# Patient Record
Sex: FEMALE | Race: BLACK OR AFRICAN AMERICAN | Employment: OTHER | URBAN - METROPOLITAN AREA
[De-identification: names, ages, dates, MRNs, and addresses within clinical notes are randomized per-mention and may not be internally consistent; named-entity substitution may affect disease eponyms.]

---

## 2018-09-06 PROBLEM — I63.9 STROKE (HCC): Status: ACTIVE | Noted: 2018-09-06

## 2018-09-19 ENCOUNTER — OFFICE VISIT (OUTPATIENT)
Dept: FAMILY MEDICINE CLINIC | Age: 63
End: 2018-09-19

## 2018-09-19 VITALS
WEIGHT: 152.6 LBS | RESPIRATION RATE: 16 BRPM | DIASTOLIC BLOOD PRESSURE: 70 MMHG | OXYGEN SATURATION: 97 % | TEMPERATURE: 97.4 F | HEART RATE: 92 BPM | BODY MASS INDEX: 27.04 KG/M2 | SYSTOLIC BLOOD PRESSURE: 112 MMHG | HEIGHT: 63 IN

## 2018-09-19 DIAGNOSIS — I10 ESSENTIAL HYPERTENSION: ICD-10-CM

## 2018-09-19 DIAGNOSIS — H53.8 BLURRY VISION: ICD-10-CM

## 2018-09-19 DIAGNOSIS — R79.89 ELEVATED TSH: ICD-10-CM

## 2018-09-19 DIAGNOSIS — Z86.73 HISTORY OF CVA (CEREBROVASCULAR ACCIDENT): Primary | ICD-10-CM

## 2018-09-19 DIAGNOSIS — D64.9 ANEMIA, UNSPECIFIED TYPE: ICD-10-CM

## 2018-09-19 DIAGNOSIS — D53.9 NUTRITIONAL ANEMIA: ICD-10-CM

## 2018-09-19 DIAGNOSIS — Z12.11 COLON CANCER SCREENING: ICD-10-CM

## 2018-09-19 DIAGNOSIS — Z11.59 ENCOUNTER FOR HEPATITIS C SCREENING TEST FOR LOW RISK PATIENT: ICD-10-CM

## 2018-09-19 DIAGNOSIS — Z86.39 HISTORY OF GRAVES' DISEASE: ICD-10-CM

## 2018-09-19 DIAGNOSIS — H53.483 VISUAL FIELD CONSTRICTION, BILATERAL: ICD-10-CM

## 2018-09-19 DIAGNOSIS — E46 PROTEIN MALNUTRITION (HCC): ICD-10-CM

## 2018-09-19 DIAGNOSIS — R41.3 MEMORY PROBLEM: ICD-10-CM

## 2018-09-19 DIAGNOSIS — F17.200 TOBACCO USE DISORDER: ICD-10-CM

## 2018-09-19 DIAGNOSIS — R77.0 ABNORMAL ALBUMIN: ICD-10-CM

## 2018-09-19 DIAGNOSIS — R73.09 ELEVATED HEMOGLOBIN A1C: ICD-10-CM

## 2018-09-19 PROBLEM — R32 URINARY INCONTINENCE: Status: ACTIVE | Noted: 2018-09-19

## 2018-09-19 PROBLEM — I63.9 STROKE (HCC): Status: RESOLVED | Noted: 2018-09-06 | Resolved: 2018-09-19

## 2018-09-19 NOTE — MR AVS SNAPSHOT
[address] [place] [address] 
[phone] Patient: Baylee Chun MRN: [id number] Visit Information Date & Time Provider Department Dept. Phone Encounter #  
 9/19/2018 10:30 AM Brien Padilla MD [address] [phone] [id number] Follow-up Instructions Return in about 2 weeks (around 10/3/2018) for MEMORY/MOOD EVALUATION. Upcoming Health Maintenance Date Due Hepatitis C Screening 1955 Pneumococcal 19-64 Medium Risk (1 of 1 - PPSV23) 11/12/1974 DTaP/Tdap/Td series (1 - Tdap) 11/12/1976 PAP AKA CERVICAL CYTOLOGY 11/12/1976 BREAST CANCER SCRN MAMMOGRAM 11/12/2005 FOBT Q 1 YEAR AGE 50-75 11/12/2005 ZOSTER VACCINE AGE 60> 9/12/2015 MEDICARE YEARLY EXAM 3/20/2018 Influenza Age 5 to Adult 8/1/2018 Allergies as of 9/19/2018  Review Complete On: 9/19/2018 By: Blanka Luevano LPN Severity Noted Reaction Type Reactions Latex  12/15/2014    Other (comments)  
 rash Ampicillin  02/01/2011    Other (comments) Aspirin  11/11/2017    Palpitations Erythromycin  11/11/2017    Hives Milk Containing Products  11/11/2017    Not Reported This Time  
 Sulfa (Sulfonamide Antibiotics)  11/11/2017    Hives Tape [Adhesive]  02/22/2017    Other (comments) Tetracycline  02/01/2011    Other (comments) Current Immunizations  Never Reviewed No immunizations on file. Not reviewed this visit You Were Diagnosed With   
  
 Codes Comments History of CVA (cerebrovascular accident)    -  Primary ICD-10-CM: Z86.73 
ICD-9-CM: V12.54 Memory problem     ICD-10-CM: R41.3 ICD-9-CM: 780.93 Elevated TSH     ICD-10-CM: R94.6 ICD-9-CM: 794.5 Blurry vision     ICD-10-CM: H53.8 ICD-9-CM: 368.8 Visual field constriction, bilateral     ICD-10-CM: [id number] ICD-9-CM: 368.45   
 Elevated hemoglobin A1c     ICD-10-CM: R73.09 
ICD-9-CM: 790.29 Abnormal albumin     ICD-10-CM: R77.0 ICD-9-CM: 790.99 Anemia, unspecified type     ICD-10-CM: D64.9 ICD-9-CM: 285.9 Essential hypertension     ICD-10-CM: I10 
ICD-9-CM: 401.9 Tobacco use disorder     ICD-10-CM: F17.200 ICD-9-CM: 305.1 History of Graves' disease     ICD-10-CM: Z86.39 
ICD-9-CM: V12.29 Vitals BP Pulse Temp Resp Height(growth percentile) Weight(growth percentile) 112/70 92 97.4 °F (36.3 °C) (Oral) 16 5' 2.5\" (1.588 m) 152 lb 9.6 oz (69.2 kg) SpO2 BMI OB Status Smoking Status 97% 27.47 kg/m2 Hysterectomy Current Every Day Smoker BMI and BSA Data Body Mass Index Body Surface Area  
 27.47 kg/m 2 1.75 m 2 Preferred Pharmacy Pharmacy Name Phone [address], [address] [facility] [phone] Your Updated Medication List  
  
   
This list is accurate as of 9/19/18 11:49 AM.  Always use your most recent med list. amLODIPine 5 mg tablet Commonly known as:  Flip Chafe Take 1 Tab by mouth daily. atorvastatin 40 mg tablet Commonly known as:  LIPITOR Take 1 Tab by mouth daily. Blood-Glucose Meter monitoring kit Please specify brand: Freestyle [place] Lite CLARITIN 10 mg tablet Generic drug:  loratadine Take 10 mg by mouth daily. clopidogrel 75 mg Tab Commonly known as:  PLAVIX Take 1 Tab by mouth daily. cod liver oil Cap Take 1 Cap by mouth daily. glucose blood VI test strips strip Commonly known as:  blood glucose test  
Please specify brand:Freestyle Lite Test Strips (use with Freestyle Freedom Lite or Freestyle Lite blood glucose meters)  
  
 hydroCHLOROthiazide 25 mg tablet Commonly known as:  HYDRODIURIL Take 25 mg by mouth daily. Lancets Misc Commonly known as:  [place] Fly Bid  
  
 metFORMIN 500 mg tablet Commonly known as:  GLUCOPHAGE  
 Take 1 Tab by mouth daily (with breakfast). nicotine 21 mg/24 hr  
Commonly known as:  NICODERM CQ  
1 Patch by TransDERmal route daily for 30 days. OMEGA 3-6-9 1,200 mg Cap Generic drug:  fish,bora,flax oils-om3,6,9no1 Take 1 Tab by mouth daily. ondansetron 4 mg disintegrating tablet Commonly known as:  ZOFRAN ODT Take 1 Tab by mouth every eight (8) hours as needed for Nausea. polyethylene glycol 17 gram packet Commonly known as:  Elena Fail Take 1 Packet by mouth daily as needed. potassium chloride SR 20 mEq tablet Commonly known as:  K-TAB Take 1 Tab by mouth daily. sucralfate 100 mg/mL suspension Commonly known as:  Gaylan Skeans Take 5 mL by mouth four (4) times daily. We Performed the Following REFERRAL TO OPHTHALMOLOGY [REF57 Custom] Comments:  
 Please evaluate patient for visual field loss, blurry vision, newly diagnosed DM [place]. Follow-up Instructions Return in about 2 weeks (around 10/3/2018) for MEMORY/MOOD EVALUATION. Referral Information Referral ID Referred By Referred To  
  
 [id number] Amanda Manzo Not Available Visits Status Start Date End Date 1 New Request 9/19/18 9/19/19 If your referral has a status of pending review or denied, additional information will be sent to support the outcome of this decision. Patient Instructions STOP METFORMIN/GLUCOPHAGE RETURN IN 2 WEEKS, RETURN IN MORNING FOR BLOOD WORK AT LEAST 2 DAYS BEFORE NEXT APPOINTMENT INCREASE PROTEIN IN DIET We are sending a referral to [address] [place] [address] . You should be called about this in 1 WEEK. If no word in 1 week please give us a call to follow up Learning About Benefits From Quitting Smoking How does quitting smoking make you healthier? If you're thinking about quitting smoking, you may have a few reasons to be smoke-free. Your health may be one of them. · When you quit smoking, you lower your risks for cancer, lung disease, heart attack, stroke, blood vessel disease, and blindness from macular degeneration. · When you're smoke-free, you get sick less often, and you heal faster. You are less likely to get colds, flu, bronchitis, and pneumonia. · As a nonsmoker, you may find that your mood is better and you are less stressed. When and how will you feel healthier? Quitting has real health benefits that start from day 1 of being smoke-free. And the longer you stay smoke-free, the healthier you get and the better you feel. The first hours · After just 20 minutes, your blood pressure and heart rate go down. That means there's less stress on your heart and blood vessels. · Within 12 hours, the level of carbon monoxide in your blood drops back to normal. That makes room for more oxygen. With more oxygen in your body, you may notice that you have more energy than when you smoked. After 2 weeks · Your lungs start to work better. · Your risk of heart attack starts to drop. After 1 month · When your lungs are clear, you cough less and breathe deeper, so it's easier to be active. · Your sense of taste and smell return. That means you can enjoy food more than you have since you started smoking. Over the years · After 1 year, your risk of heart disease is half what it would be if you kept smoking. · After 5 years, your risk of stroke starts to shrink. Within a few years after that, it's about the same as if you'd never smoked. · After 10 years, your risk of dying from lung cancer is cut by about half. And your risk for many other types of cancer is lower too. How would quitting help others in your life? When you quit smoking, you improve the health of everyone who now breathes in your smoke. · Their heart, lung, and cancer risks drop, much like yours. · They are sick less.  For babies and small children, living smoke-free means they're less likely to have ear infections, pneumonia, and bronchitis. · If you're a woman who is or will be pregnant someday, quitting smoking means a healthier . · Children who are close to you are less likely to become adult smokers. Where can you learn more? Go to http://adina-yamila.info/. Enter [id number] [id number] 11 in the search box to learn more about \"Learning About Benefits From Quitting Smoking. \" Current as of: 2017 Content Version: 11.7 © 2179-8477 [employer]. Care instructions adapted under license by [employer] (which disclaims liability or warranty for this information). If you have questions about a medical condition or this instruction, always ask your healthcare professional. [address] any warranty or liability for your use of this information. Introducing [facility]! [employer] introduces [employer] patient portal. Now you can access parts of your medical record, email your doctor's office, and request medication refills online. 1. In your internet browser, go to [email or web address] 2. Click on the First Time User? Click Here link in the Sign In box. You will see the New Member Sign Up page. 3. Enter your [employer] Access Code exactly as it appears below. You will not need to use this code after youve completed the sign-up process. If you do not sign up before the expiration date, you must request a new code. · [employer] Access Code: [email or web address] Expires: 2018 10:50 PM 
 
4. Enter the last four digits of your Social Security Number (xxxx) and Date of Birth (mm/dd/yyyy) as indicated and click Submit. You will be taken to the next sign-up page. 5. Create a [employer] ID. This will be your [employer] login ID and cannot be changed, so think of one that is secure and easy to remember. 6. Create a [employer] password. You can change your password at any time. 7. Enter your Password Reset Question and Answer. This can be used at a later time if you forget your password. 8. Enter your e-mail address. You will receive e-mail notification when new information is available in [address] 9. Click Sign Up. You can now view and download portions of your medical record. 10. Click the Download Summary menu link to download a portable copy of your medical information. If you have questions, please visit the Frequently Asked Questions section of the [employer] website. Remember, [employer] is NOT to be used for urgent needs. For medical emergencies, dial 911. Now available from your iPhone and Android! Please provide this summary of care documentation to your next provider. Your primary care clinician is listed as NOT ON FILE. If you have any questions after today's visit, please call [phone].

## 2018-09-19 NOTE — PROGRESS NOTES
INTERNAL MEDICINE INITIAL PROVIDER VISIT SUBJECTIVE:  
 
Chief Complaint Patient presents with  [facility]ospital [facility] Follow Up  
  [facility] 9/7/18. Neurology app[place] 9/20/18 [place] [address]. HPI: 58 y.o. female with PMHx significant for recent CVA is here for the above chief complaint(s). Here with son Rosanna Odonnell. [facility]. Last PCP pt first.  
 
Hospital Discharge for stroke 9/6-9/2018: found to have left occipital lobe acute infarct, occlusion of left proximal PCA, old infarct right frontal lobe and bilateral cerebellum infarcts. Has f/u with neurology Dr. Joy Muniz 9/20/2018. Had complained of let sided headache, blurry vision, nausea, dizziness on admission and continued BV at time of discharge. Started on plavix 75 m g daily, statin, encouraged smoking cessation, norvasc for BP, potassium for low potassium. ST/PT/OT ordered during hospitalization but unable to find notes for ST for swallow evaluation, PT gave patient walker and HH ordered for SN/PT/OT/ST. Seen by [facility]  9/17/2018 with labs obtained K 3.7, ionized Ca 4.8, BUN/Cr 8/0.5, Hb/Hct 15/44, glucose 106, Sodium 141. Per patient and family home health has 9/11/2018 SN started with [facility]. OT came this week, PT came this week. No ST yet. Since discharge pt reports symptoms of memory loss, blurry vision, dizziness about the same since d/c, LH \"loopiness\" better since d/c. No trouble walking. Feels some concentrating on swallowing, no coughing with water or food intake. Numbness \"slight\" in left leg, intermittent. [facility]: Called [phone]. No ST order, SW order in place. Fax number [phone] order faxed for  cognitive eval and rehabilitation and swallow evaluation. Elevated A1c: 6.6 seen during recent hospitalization. No repeat to confirm. Started on glucophage 500 mg daily. Dyslipidemia: started on atorvastatin 40 mg in hospital.  No new myalgias since starting medications. HTN: Taking norvasc 5 mg daily, BP controlled today. better LH and unchanged dizziness since being on this medicine. Tobacco use disorder: not ready to quit. Discussed benefits of quitting with patient and informed to return when ready to discuss quitting plan. ROS: 12 point ROS completed, positive per HPI and hot flashes, weight gain, poor dentition, palpitations intermittently, hip/joint pain, intermittent coughing/wheezing, nausea w/o vomiting, urinary incontinence, headaches, anxiety and stress. Current Outpatient Prescriptions Medication Sig  
 amLODIPine (NORVASC) 5 mg tablet Take 1 Tab by mouth daily.  atorvastatin (LIPITOR) 40 mg tablet Take 1 Tab by mouth daily.  clopidogrel (PLAVIX) 75 mg tab Take 1 Tab by mouth daily.  hydroCHLOROthiazide (HYDRODIURIL) 25 mg tablet Take 25 mg by mouth daily.  loratadine (CLARITIN) 10 mg tablet Take 10 mg by mouth daily.  nicotine (NICODERM CQ) 21 mg/24 hr 1 Patch by TransDERmal route daily for 30 days.  polyethylene glycol (MIRALAX) 17 gram packet Take 1 Packet by mouth daily as needed.  Blood-Glucose Meter monitoring kit Please specify brand: Freestyle Freedom Lite  Lancets (LANCETS,ULTRA THIN) misc Bid  glucose blood VI test strips (BLOOD GLUCOSE TEST) strip Please specify brand:Freestyle Lite Test Strips (use with Freestyle Freedom Lite or Freestyle Lite blood glucose meters)  ondansetron (ZOFRAN ODT) 4 mg disintegrating tablet Take 1 Tab by mouth every eight (8) hours as needed for Nausea.  potassium chloride SR (K-TAB) 20 mEq tablet Take 1 Tab by mouth daily.  sucralfate (CARAFATE) 100 mg/mL suspension Take 5 mL by mouth four (4) times daily.  fish,bora,flax oils-om3,6,9 #1 (OMEGA 3-6-9) 1,200 mg cap Take 1 Tab by mouth daily.  cod liver oil cap Take 1 Cap by mouth daily. No current facility-administered medications for this visit. Health Maintenance Topic Date Due  
  Hepatitis C Screening  1955  Pneumococcal 19-64 Medium Risk (1 of 1 - PPSV23) 11/12/1974  
 DTaP/Tdap/Td series (1 - Tdap) 11/12/1976  PAP AKA CERVICAL CYTOLOGY  11/12/1976  BREAST CANCER SCRN MAMMOGRAM  11/12/2005  FOBT Q 1 YEAR AGE 50-75  11/12/2005  ZOSTER VACCINE AGE 60>  09/12/2015  MEDICARE YEARLY EXAM  03/20/2018  Influenza Age 5 to Adult  08/01/2018 Medications and Allergies: Reviewed and confirmed in the chart Past Medical Hx: Reviewed and confirmed in the chart Past Medical History:  
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  
 occlusion of left PCA seen on CTA brain  Hypertension  Memory problem 1 year per son as of 9/2018  Tobacco use disorder  UTI (urinary tract infection) Patient Active Problem List  
Diagnosis Code  History of CVA (cerebrovascular accident) Z80.78  
 Hx of ischemic left PCA stroke Z86.73  
 Elevated hemoglobin A1c R73.09  
 Abnormal albumin R77.0  Anemia D64.9  Hypertension I10  Tobacco use disorder F17.200  Environmental and seasonal allergies J30.89  Memory problem R41.3  History of Graves' disease Z86.39  
 Visual field constriction, bilateral C45.794  Blurry vision H53.8  Cataract H26.9  Arthritis M19.90  
 Urinary incontinence R32 Family Hx, Surgical Hx, Social Hx: Reviewed and updated in EMR OBJECTIVE: 
Vitals:  
 09/19/18 1051 BP: 112/70 Pulse: 92 Resp: 16 Temp: 97.4 °F (36.3 °C) TempSrc: Oral  
SpO2: 97% Weight: 152 lb 9.6 oz (69.2 kg) Height: 5' 2.5\" (1.588 m) BP Readings from Last 3 Encounters:  
09/19/18 112/70  
09/09/18 180/87  
09/03/18 (!) 148/91 Wt Readings from Last 3 Encounters:  
09/19/18 152 lb 9.6 oz (69.2 kg) 09/09/18 165 lb 12.6 oz (75.2 kg) 09/03/18 130 lb (59 kg) General: Pleasant late middle aged woman sitting comfortably in no acute distress HEENT: Head is atraumatic normo-cephalic. Pupils equally round and reactive to light, extraocular muscle intact, conjunctiva clear, non-icterus. Oral mucosa moist without erythema, ulceration. 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 CVS:Heart regular, rate, and rhythm. Audible S1 and S2. No murmurs, rubs or gallops. PULM: Lungs clear to auscultation bilaterally. No wheezes, rales or rhonchi. GI: Positive bowel sounds, soft, nondistended, non-tender. EXT: 2+ dorsalis pedis pulses bilaterally. No pedal edema bilaterally Neuro: Alert and oriented x 3. Gait wnl. .  
MSE: euthymic, affect congruent and reactive. No SI/HI. Lab Results Component Value Date/Time WBC 8.6 09/07/2018 05:02 AM  
 HGB 10.9 (L) 09/07/2018 05:02 AM  
 HCT 33.5 (L) 09/07/2018 05:02 AM  
 PLATELET 728 14/31/0835 05:02 AM  
 
Lab Results Component Value Date/Time Sodium 140 09/09/2018 05:13 AM  
 Potassium 4.7 09/09/2018 05:13 AM  
 Chloride 107 09/09/2018 05:13 AM  
 CO2 28 09/09/2018 05:13 AM  
 Glucose 108 (H) 09/09/2018 05:13 AM  
 BUN 10 09/09/2018 05:13 AM  
 Creatinine 0.6 09/09/2018 05:13 AM  
 
Lab Results Component Value Date/Time Cholesterol, total 185 09/07/2018 05:02 AM  
 HDL Cholesterol 38 (L) 09/07/2018 05:02 AM  
 LDL, calculated 123 09/07/2018 05:02 AM  
 Triglyceride 120 09/07/2018 05:02 AM  
 
9/7/2018  1:28 AM - Dionte, "[employer] Lab In Component Results Component Value Flag Ref Range Units Status Hemoglobin A1c 6.6 (H) 4.2 - 6.3 % Final  
 
9/7/2018  6:00 AM - [facility] Lab In Component Results Component Value Flag Ref Range Units Status TSH 14.200 (H) 0.358 - 3.740 uIU/mL Final  
 
 
Results CTA HEAD (Accession [id number]) (Order [id number]) Allergies Unspecified: Latex; Aspirin;  Erythromycin;  Milk Containing Products;  Sulfa (Sulfonamide Antibiotics); Tape [Adhesive] Result Information Status Provider Status Final result (Exam End: 9/7/2018 12:44 PM) Open Study Result CTA OF THE HEAD INDICATION: Follow-up left occipital lobe infarction. Vertigo COMPARISON: Head CT 9/6/2018 TECHNIQUE: Noncontrast CT of the head was performed followed by CTA of the head 
was performed with  intravenous contrast. Multiplanar two-dimensional and 
maximum intensity projection reformats performed and reviewed. FINDINGS: 
  
Noncontrast CT of the head redemonstrates also effacement within the left 
occipital lobe. Stable remote infarcts within the right frontal lobe and 
bilateral cerebellum. There is no intracranial hemorrhage. The left vertebral artery is dominant. There is focal occlusion of the left P1/P2 segment of the posterior cerebral artery seen best on image 43 series 18. There are calcifications of the bilateral carotid siphon carlos. No definite focal 
stenosis within the anterior circulation, vertebral arteries, or basilar artery. Communicating arteries are not well visualized. No definite aneurysm. IMPRESSION IMPRESSION:  
  
Occlusion of left proximal PCA Ro Hernández was notified of the above findings via telephone at 9/7/2018 1:17 PM 
EDT by Dr. Bennett Choudhary. Results CT HEAD WO CONT (Accession [id number]) (Order [id number]) Allergies Unspecified: Latex; Aspirin;  Erythromycin;  Milk Containing Products;  Sulfa (Sulfonamide Antibiotics); Tape [Adhesive] Result Information Status Provider Status Final result (Exam End: 9/6/2018  6:44 PM) Open Study Result Clinical history: Episodic peripheral vertigo EXAMINATION: 
 Noncontrast CT scan of the head 9/6/2018. 4 mm axial scanning is performed from 
the skull base to the vertex. Coronal and sagittal reconstruction imaging has 
been obtained. Correlation: None FINDINGS: 
Visualized paranasal sinuses and mastoid air cells are clear. There are 
intracranial vascular calcifications. Old bilateral cerebellar infarctions. Old 
infarctions right frontal lobe. Acute/subacute infarction left occipital lobe. No acute intracranial hemorrhage or mass. IMPRESSION IMPRESSION: 
1. Acute/subacute left occipital lobe infarction. 2. Atrophy and periventricular microvascular ischemia. 3. Old infarction right frontal lobe and bilateral cerebellum. CT Results (most recent): 
  
Results from Hospital Encounter encounter on 09/06/18 CTA NECK Narrative CTA OF THE NECK WITH CONTRAST INDICATION: Vertigo. Follow-up left occipital infarct COMPARISON: No relevant studies. TECHNIQUE: CTA of the neck was performed with nonionic intravenous contrast with 
multiplanar 2-dimensional and maximum intensity projection three-dimensional 
images. Stenosis was evaluated using the NASCET criteria. FINDINGS: 
 
Vascular calcifications are seen involving the bilateral carotid bulbs, origin 
of the left vertebral artery, and involving the bilateral carotid siphons. There 
is no hemodynamically significant stenosis within the carotid or vertebral 
arteries. There is no evidence of dissection. CAROTID STENOSIS REFERENCE USING NASCET CRITERIA 
 
% Stenosis = (1 - narrowest diameter/diameter of distal artery) x100 Mild: < 50% stenosis Moderate: 50-69% stenosis. Severe: 70-94% stenosis. Near occlusion: 95-99% stenosis. Occluded: 100% stenosis Impression IMPRESSION:  
 
No focal hemodynamically significant stenosis. Please refer to CTA of the brain 
report for additional findings (left PCA occlusion) 9/7/2018  1:47 PM - Dionte, [facility] Incoming Cardiology Results Narrative Study ID: [id number] [address], [address] [place] [address],  [address] Adult Echocardiogram Report Name: Kota Fletcher Date: 2018 10:23 AM 
MRN: [id number]                Patient Location: [facility]^0090^2341 : 1955            Age: 58 yrs Height: 62 in              Weight: 159 lb                         BSA: 1.7 m2 BP: 137/66 mmHg            HR: 56 Gender: Female             Account #: [de-identified] Reason For Study: EVAluate EF, wall motion, valves History: 
HX OF ARTHRITIS, HTN, UTI, BILATERAL HIP REPLACEMENT Ordering Physician: Bethanie Rand Performed By: Drue Romberg., [facility] Interpretation Summary A complete two-dimensional transthoracic echocardiogram was performed (2D, M- 
mode, Doppler and color flow Doppler). Patient not able to position for optimal imaging. Technically difficult 
apical imaging. Normal left ventricular size and systolic function estimated ejection 
fraction of 65% Right ventricular systolic function is normal: TAPSE: 2.0. Aortic sclerosis with no stenosis No previous for comparison Hospital records reviewed. Nursing Notes Reviewed ASSESSMENT AND PLAN 
  ICD-10-CM ICD-9-CM 1. History of CVA (cerebrovascular accident) Z80.65 V14.48 401 [employer] added to open services PT/OT/SN/SW 
F/u with neurology as scheduled Continue plavix, statin, BP control Continue PT/OT/SW 2. Memory problem R41.3 780.93 REFERRAL TO HOME HEALTH  
   METABOLIC PANEL, COMPREHENSIVE  
   VITAMIN B12  
   TSH 3RD GENERATION  
   T4, FREE  
   T PALLIDUM AB  
   FOLATE Return for memory evaluation 3.  Elevated TSH R94.6 794.5 TSH 3RD GENERATION  
   T4, FREE  
 4. Blurry vision H53.8 368.8 REFERRAL TO OPHTHALMOLOGY 5. Visual field constriction, bilateral H53.483 368.45 REFERRAL TO OPHTHALMOLOGY 6. Elevated hemoglobin A1c R73.09 790.29 HEMOGLOBIN A1C WITH EAG  
   MICROALBUMIN, UR, RAND W/ MICROALB/CREAT RATIO 7. Abnormal albumin R77.0 790.99 Encouraged to increase protein in diet 8. Anemia, unspecified type D64.9 285.9 CBC WITH AUTOMATED DIFF  
   IRON PROFILE FERRITIN  
   VITAMIN B12  
9. Essential hypertension C73 568.5 METABOLIC PANEL, COMPREHENSIVE Continue current mgmt 10. Tobacco use disorder F17.200 305.1 Pt pre-contemplative about quitting Encouraged cessation 11. History of Graves' disease Z86.39 V12.29 TSH 3RD GENERATION  
   T4, FREE 12. Encounter for hepatitis C screening test for low risk patient Z11.59 V73.89 HEPATITIS C AB  
13. Nutritional anemia D53.9 281.9 IRON PROFILE FERRITIN  
   VITAMIN B12  
   FOLATE 14. Protein malnutrition ([address] M78 872 METABOLIC PANEL, COMPREHENSIVE 15. Colon cancer screening Z12.11 V76.51 OCCULT BLOOD IMMUNOASSAY,DIAGNOSTIC Orders Placed This Encounter  METABOLIC PANEL, COMPREHENSIVE  
 CBC WITH AUTOMATED DIFF  
 IRON PROFILE  
 FERRITIN  
 VITAMIN B12  
 TSH 3RD GENERATION  
 T4, FREE  
 HEMOGLOBIN A1C WITH EAG  
 HEPATITIS C AB  T PALLIDUM AB  FOLATE  OCCULT BLOOD IMMUNOASSAY,DIAGNOSTIC  
 MICROALBUMIN, UR, RAND W/ MICROALB/CREAT RATIO  REFERRAL TO OPHTHALMOLOGY [address] [place] [address] Appointments Date Time Provider Marah Rodas 10/5/2018 3:00 PM Gayle Pena MD [address] [place] [address] printed and provided to patient Assessment and plan above discussed with patient, patient voiced understanding and agreement with plan. More than 50% of this 60 min visit was spent face to face counseling the patient about the etiology and treatment options for the above health conditions outlined in assessment and plan Gayle Pena M.D. 
 [address], [address] [place], [address] [facility] - [phone] Fax - [phone]

## 2018-09-19 NOTE — PATIENT INSTRUCTIONS
STOP METFORMIN/GLUCOPHAGE RETURN IN 2 WEEKS, RETURN IN MORNING FOR BLOOD WORK AT LEAST 2 DAYS BEFORE NEXT APPOINTMENT INCREASE PROTEIN IN DIET We are sending a referral to [address] [place] [address] . You should be called about this in 1 WEEK. If no word in 1 week please give us a call to follow up Learning About Benefits From Quitting Smoking How does quitting smoking make you healthier? If you're thinking about quitting smoking, you may have a few reasons to be smoke-free. Your health may be one of them. · When you quit smoking, you lower your risks for cancer, lung disease, heart attack, stroke, blood vessel disease, and blindness from macular degeneration. · When you're smoke-free, you get sick less often, and you heal faster. You are less likely to get colds, flu, bronchitis, and pneumonia. · As a nonsmoker, you may find that your mood is better and you are less stressed. When and how will you feel healthier? Quitting has real health benefits that start from day 1 of being smoke-free. And the longer you stay smoke-free, the healthier you get and the better you feel. The first hours · After just 20 minutes, your blood pressure and heart rate go down. That means there's less stress on your heart and blood vessels. · Within 12 hours, the level of carbon monoxide in your blood drops back to normal. That makes room for more oxygen. With more oxygen in your body, you may notice that you have more energy than when you smoked. After 2 weeks · Your lungs start to work better. · Your risk of heart attack starts to drop. After 1 month · When your lungs are clear, you cough less and breathe deeper, so it's easier to be active. · Your sense of taste and smell return. That means you can enjoy food more than you have since you started smoking. Over the years · After 1 year, your risk of heart disease is half what it would be if you kept smoking. · After 5 years, your risk of stroke starts to shrink. Within a few years after that, it's about the same as if you'd never smoked. · After 10 years, your risk of dying from lung cancer is cut by about half. And your risk for many other types of cancer is lower too. How would quitting help others in your life? When you quit smoking, you improve the health of everyone who now breathes in your smoke. · Their heart, lung, and cancer risks drop, much like yours. · They are sick less. For babies and small children, living smoke-free means they're less likely to have ear infections, pneumonia, and bronchitis. · If you're a woman who is or will be pregnant someday, quitting smoking means a healthier . · Children who are close to you are less likely to become adult smokers. Where can you learn more? Go to http://adina-yamila.info/. Enter 052 [id number] 72 11 in the search box to learn more about \"Learning About Benefits From Quitting Smoking. \" Current as of: 2017 Content Version: 11.7 © 0012-5863 [employer]. Care instructions adapted under license by [employer] (which disclaims liability or warranty for this information). If you have questions about a medical condition or this instruction, always ask your healthcare professional. [address] any warranty or liability for your use of this information.

## 2018-09-27 ENCOUNTER — TELEPHONE (OUTPATIENT)
Dept: FAMILY MEDICINE CLINIC | Age: 63
End: 2018-09-27

## 2018-09-27 NOTE — TELEPHONE ENCOUNTER
Received fax transmission from [address] [facility] of Encounter Note. Sent to be signed, sent, and scanned.

## 2018-09-28 ENCOUNTER — TELEPHONE (OUTPATIENT)
Dept: FAMILY MEDICINE CLINIC | Age: 63
End: 2018-09-28

## 2018-09-28 NOTE — TELEPHONE ENCOUNTER
Gabriel Aden with [facility] visited pt today. States called paramedics for pt due to  Had severe change in mental status. States could not understand what pt was saying. [facility] pt was hospitalized a couple of weeks ago due to CVA,. [facility] pt is being taken to [facility].

## 2018-10-03 ENCOUNTER — HOSPITAL ENCOUNTER (OUTPATIENT)
Dept: LAB | Age: 63
Discharge: HOME | End: 2018-10-03
Payer: MEDICARE

## 2018-10-03 ENCOUNTER — LAB ONLY (OUTPATIENT)
Dept: FAMILY MEDICINE CLINIC | Age: 63
End: 2018-10-03

## 2018-10-03 DIAGNOSIS — R41.3 MEMORY PROBLEM: ICD-10-CM

## 2018-10-03 DIAGNOSIS — I10 ESSENTIAL HYPERTENSION: ICD-10-CM

## 2018-10-03 DIAGNOSIS — R73.09 ELEVATED HEMOGLOBIN A1C: ICD-10-CM

## 2018-10-03 DIAGNOSIS — D53.9 NUTRITIONAL ANEMIA: ICD-10-CM

## 2018-10-03 DIAGNOSIS — Z11.59 ENCOUNTER FOR HEPATITIS C SCREENING TEST FOR LOW RISK PATIENT: ICD-10-CM

## 2018-10-03 DIAGNOSIS — D64.9 ANEMIA, UNSPECIFIED TYPE: ICD-10-CM

## 2018-10-03 DIAGNOSIS — Z86.39 HISTORY OF GRAVES' DISEASE: ICD-10-CM

## 2018-10-03 DIAGNOSIS — E46 PROTEIN MALNUTRITION (HCC): ICD-10-CM

## 2018-10-03 DIAGNOSIS — R73.09 ELEVATED HEMOGLOBIN A1C: Primary | ICD-10-CM

## 2018-10-03 DIAGNOSIS — R79.89 ELEVATED TSH: ICD-10-CM

## 2018-10-03 LAB
ALBUMIN SERPL-MCNC: 3.6 G/DL (ref 3.4–5)
ALBUMIN/GLOB SERPL: 0.9 {RATIO} (ref 0.8–1.7)
ALP SERPL-CCNC: 144 U/L (ref 45–117)
ALT SERPL-CCNC: 60 U/L (ref 13–56)
ANION GAP SERPL CALC-SCNC: 13 MMOL/L (ref 3–18)
AST SERPL-CCNC: 53 U/L (ref 15–37)
BASOPHILS # BLD: 0 K/UL (ref 0–0.1)
BASOPHILS NFR BLD: 0 % (ref 0–2)
BILIRUB SERPL-MCNC: 0.2 MG/DL (ref 0.2–1)
BUN SERPL-MCNC: 8 MG/DL (ref 7–18)
BUN/CREAT SERPL: 14 (ref 12–20)
CALCIUM SERPL-MCNC: 9.2 MG/DL (ref 8.5–10.1)
CHLORIDE SERPL-SCNC: 101 MMOL/L (ref 100–108)
CO2 SERPL-SCNC: 28 MMOL/L (ref 21–32)
CREAT SERPL-MCNC: 0.57 MG/DL (ref 0.6–1.3)
DIFFERENTIAL METHOD BLD: ABNORMAL
EOSINOPHIL # BLD: 0.1 K/UL (ref 0–0.4)
EOSINOPHIL NFR BLD: 1 % (ref 0–5)
ERYTHROCYTE [DISTWIDTH] IN BLOOD BY AUTOMATED COUNT: 14.7 % (ref 11.6–14.5)
EST. AVERAGE GLUCOSE BLD GHB EST-MCNC: 140 MG/DL
FERRITIN SERPL-MCNC: 68 NG/ML (ref 8–388)
FOLATE SERPL-MCNC: 5.8 NG/ML (ref 3.1–17.5)
GLOBULIN SER CALC-MCNC: 4.2 G/DL (ref 2–4)
GLUCOSE SERPL-MCNC: 110 MG/DL (ref 74–99)
HBA1C MFR BLD: 6.5 % (ref 4.2–5.6)
HCT VFR BLD AUTO: 38.9 % (ref 35–45)
HGB BLD-MCNC: 12.1 G/DL (ref 12–16)
IRON SATN MFR SERPL: 13 %
IRON SERPL-MCNC: 46 UG/DL (ref 50–175)
LYMPHOCYTES # BLD: 3.9 K/UL (ref 0.9–3.6)
LYMPHOCYTES NFR BLD: 36 % (ref 21–52)
MCH RBC QN AUTO: 28.1 PG (ref 24–34)
MCHC RBC AUTO-ENTMCNC: 31.1 G/DL (ref 31–37)
MCV RBC AUTO: 90.5 FL (ref 74–97)
MONOCYTES # BLD: 0.6 K/UL (ref 0.05–1.2)
MONOCYTES NFR BLD: 5 % (ref 3–10)
NEUTS SEG # BLD: 6.3 K/UL (ref 1.8–8)
NEUTS SEG NFR BLD: 58 % (ref 40–73)
PLATELET # BLD AUTO: 279 K/UL (ref 135–420)
PMV BLD AUTO: 8.7 FL (ref 9.2–11.8)
POTASSIUM SERPL-SCNC: 3.7 MMOL/L (ref 3.5–5.5)
PROT SERPL-MCNC: 7.8 G/DL (ref 6.4–8.2)
RBC # BLD AUTO: 4.3 M/UL (ref 4.2–5.3)
SODIUM SERPL-SCNC: 142 MMOL/L (ref 136–145)
T4 FREE SERPL-MCNC: 0.9 NG/DL (ref 0.7–1.5)
TIBC SERPL-MCNC: 352 UG/DL (ref 250–450)
TSH SERPL DL<=0.05 MIU/L-ACNC: 5.6 UIU/ML (ref 0.36–3.74)
VIT B12 SERPL-MCNC: 422 PG/ML (ref 211–911)
WBC # BLD AUTO: 10.8 K/UL (ref 4.6–13.2)

## 2018-10-03 PROCEDURE — 86592 SYPHILIS TEST NON-TREP QUAL: CPT | Performed by: INTERNAL MEDICINE

## 2018-10-03 PROCEDURE — 83540 ASSAY OF IRON: CPT | Performed by: INTERNAL MEDICINE

## 2018-10-03 PROCEDURE — 85025 COMPLETE CBC W/AUTO DIFF WBC: CPT | Performed by: INTERNAL MEDICINE

## 2018-10-03 PROCEDURE — 84439 ASSAY OF FREE THYROXINE: CPT | Performed by: INTERNAL MEDICINE

## 2018-10-03 PROCEDURE — 82746 ASSAY OF FOLIC ACID SERUM: CPT | Performed by: INTERNAL MEDICINE

## 2018-10-03 PROCEDURE — 83036 HEMOGLOBIN GLYCOSYLATED A1C: CPT | Performed by: INTERNAL MEDICINE

## 2018-10-03 PROCEDURE — 84443 ASSAY THYROID STIM HORMONE: CPT | Performed by: INTERNAL MEDICINE

## 2018-10-03 PROCEDURE — 82728 ASSAY OF FERRITIN: CPT | Performed by: INTERNAL MEDICINE

## 2018-10-03 PROCEDURE — 80053 COMPREHEN METABOLIC PANEL: CPT | Performed by: INTERNAL MEDICINE

## 2018-10-03 PROCEDURE — 86803 HEPATITIS C AB TEST: CPT | Performed by: INTERNAL MEDICINE

## 2018-10-03 PROCEDURE — 82607 VITAMIN B-12: CPT | Performed by: INTERNAL MEDICINE

## 2018-10-03 PROCEDURE — 86780 TREPONEMA PALLIDUM: CPT | Performed by: INTERNAL MEDICINE

## 2018-10-03 PROCEDURE — 86593 SYPHILIS TEST NON-TREP QUANT: CPT | Performed by: INTERNAL MEDICINE

## 2018-10-03 NOTE — PROGRESS NOTES
Patient presents for lab draw ordered by:    Ordering Provider:  Dr Shawna Momin Department/Practice:  [facility]  Phone:  [phone]  Date Ordered:  9/2018    The following labs were drawn and sent to [facility]  by Ellie Wright LPN:    CBC, CMP, WMT0A and Folate, T. Pallidum, Hep C, T4, B12, Ferritin, iron    The following tubes were sent:     5 SST, 2Lav, 0Blue top, 0urine    Left AC cleansed using aseptic technique. Specimen obtained using a 21 gauge butterfly  with 1 attempt. Patient tolerated process well and there was no bleeding noted at site. Patient was unable to leave a urine specimen for the urine microalbumin. Urine cup given to patient to bring specimen back at her appt time.

## 2018-10-04 ENCOUNTER — TELEPHONE (OUTPATIENT)
Dept: FAMILY MEDICINE CLINIC | Age: 63
End: 2018-10-04

## 2018-10-04 LAB
HCV AB SER IA-ACNC: 0.06 INDEX
HCV AB SERPL QL IA: NEGATIVE
HCV COMMENT,HCGAC: NORMAL
RPR SER QL: ABNORMAL
RPR SER-TITR: NORMAL {TITER}
T PALLIDUM AB SER QL IA: ABNORMAL

## 2018-10-04 NOTE — TELEPHONE ENCOUNTER
[facility] plan of care signed and placed in outgoing fax. Meng Lunsford M.D.   [address], [address] [place] [address], [address] [place] [address]   [place] [phone]

## 2018-10-05 ENCOUNTER — TELEPHONE (OUTPATIENT)
Dept: FAMILY MEDICINE CLINIC | Age: 63
End: 2018-10-05

## 2018-10-05 ENCOUNTER — HOSPITAL ENCOUNTER (OUTPATIENT)
Dept: LAB | Age: 63
Discharge: HOME | End: 2018-10-05
Payer: MEDICARE

## 2018-10-05 DIAGNOSIS — Z86.73 HISTORY OF CVA (CEREBROVASCULAR ACCIDENT): Primary | ICD-10-CM

## 2018-10-05 DIAGNOSIS — R73.09 ELEVATED HEMOGLOBIN A1C: ICD-10-CM

## 2018-10-05 PROCEDURE — 82043 UR ALBUMIN QUANTITATIVE: CPT | Performed by: INTERNAL MEDICINE

## 2018-10-05 RX ORDER — CLOPIDOGREL BISULFATE 75 MG/1
75 TABLET ORAL DAILY
Qty: 30 TAB | Refills: 0 | Status: SHIPPED | OUTPATIENT
Start: 2018-10-05 | End: 2018-11-05 | Stop reason: SDUPTHER

## 2018-10-05 RX ORDER — CLOPIDOGREL BISULFATE 75 MG/1
75 TABLET ORAL DAILY
Qty: 30 TAB | Refills: 0 | Status: SHIPPED | OUTPATIENT
Start: 2018-10-05 | End: 2018-10-05 | Stop reason: SDUPTHER

## 2018-10-05 RX ORDER — ATORVASTATIN CALCIUM 40 MG/1
40 TABLET, FILM COATED ORAL DAILY
Qty: 30 TAB | Refills: 0 | Status: CANCELLED | OUTPATIENT
Start: 2018-10-05

## 2018-10-05 NOTE — TELEPHONE ENCOUNTER
Plavix rx sent to [facility] on file. Dennis Molina M.D.   [address], [address] [place] [address], [address] [place] [address]   [facility]   [phone]

## 2018-10-06 LAB
CREAT UR-MCNC: 195.15 MG/DL (ref 30–125)
MICROALBUMIN UR-MCNC: 1.2 MG/DL (ref 0–3)
MICROALBUMIN/CREAT UR-RTO: 6 MG/G (ref 0–30)

## 2018-10-08 ENCOUNTER — OFFICE VISIT (OUTPATIENT)
Dept: FAMILY MEDICINE CLINIC | Age: 63
End: 2018-10-08

## 2018-10-08 VITALS
TEMPERATURE: 98.5 F | WEIGHT: 150.4 LBS | SYSTOLIC BLOOD PRESSURE: 111 MMHG | BODY MASS INDEX: 26.65 KG/M2 | HEIGHT: 63 IN | OXYGEN SATURATION: 100 % | DIASTOLIC BLOOD PRESSURE: 72 MMHG | RESPIRATION RATE: 16 BRPM | HEART RATE: 72 BPM

## 2018-10-08 DIAGNOSIS — Z86.73 HISTORY OF CVA (CEREBROVASCULAR ACCIDENT): ICD-10-CM

## 2018-10-08 DIAGNOSIS — A52.9 LATE SYPHILIS: ICD-10-CM

## 2018-10-08 DIAGNOSIS — E66.3 OVERWEIGHT (BMI 25.0-29.9): ICD-10-CM

## 2018-10-08 DIAGNOSIS — R79.89 ELEVATED LFTS: ICD-10-CM

## 2018-10-08 DIAGNOSIS — I10 ESSENTIAL HYPERTENSION: ICD-10-CM

## 2018-10-08 DIAGNOSIS — E11.9 CONTROLLED TYPE 2 DIABETES MELLITUS WITHOUT COMPLICATION, WITHOUT LONG-TERM CURRENT USE OF INSULIN (HCC): ICD-10-CM

## 2018-10-08 DIAGNOSIS — F17.200 TOBACCO USE DISORDER: ICD-10-CM

## 2018-10-08 DIAGNOSIS — R41.3 MEMORY LOSS: Primary | ICD-10-CM

## 2018-10-08 NOTE — PROGRESS NOTES
Chief Complaint Patient presents with  Follow-up  
  memory, vision 1. Have you been to the ER, urgent care clinic since your last visit? Hospitalized since your last visit? No 
 
2. Have you seen or consulted any other health care providers outside of the [address] [place] [address] since your last visit? Include any pap smears or colon screening.  No

## 2018-10-08 NOTE — MR AVS SNAPSHOT
[address] [place] [address] [address] [address] 
[phone] Patient: Deborah Verma MRN: [id number] Visit Information Date & Time Provider Department Dept. Phone Encounter #  
 10/8/2018  4:30 PM Lynda Beckman MD [address] [phone] [id number] Follow-up Instructions Return in about 4 weeks (around 11/5/2018), or if symptoms worsen or fail to improve, for medicare wellness. Upcoming Health Maintenance Date Due Pneumococcal 19-64 Medium Risk (1 of 1 - PPSV23) 11/12/1974 DTaP/Tdap/Td series (1 - Tdap) 11/12/1976 PAP AKA CERVICAL CYTOLOGY 11/12/1976 Shingrix Vaccine Age 50> (1 of 2) 11/12/2005 BREAST CANCER SCRN MAMMOGRAM 11/12/2005 FOBT Q 1 YEAR AGE 50-75 11/12/2005 MEDICARE YEARLY EXAM 3/20/2018 Influenza Age 5 to Adult 8/1/2018 Allergies as of 10/8/2018  Review Complete On: 10/8/2018 By: Poppy Fish LPN Severity Noted Reaction Type Reactions Latex  12/15/2014    Other (comments)  
 rash Ampicillin  02/01/2011    Other (comments) Aspirin  11/11/2017    Palpitations Erythromycin  11/11/2017    Hives Milk Containing Products  11/11/2017    Not Reported This Time  
 Sulfa (Sulfonamide Antibiotics)  11/11/2017    Hives Tape [Adhesive]  02/22/2017    Other (comments) Tetracycline  02/01/2011    Other (comments) Current Immunizations  Never Reviewed No immunizations on file. Not reviewed this visit You Were Diagnosed With   
  
 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 
ICD-9-CM: V12.54 Overweight (BMI 25.0-29. 9)     ICD-10-CM: N44.0 ICD-9-CM: 278.02 Essential hypertension     ICD-10-CM: I10 
ICD-9-CM: 401.9 Tobacco use disorder     ICD-10-CM: F17.200 ICD-9-CM: 305.1 Elevated LFTs     ICD-10-CM: R94.5 ICD-9-CM: 790.6 Vitals BP Pulse Temp Resp Height(growth percentile) Weight(growth percentile) 111/72 72 98.5 °F (36.9 °C) (Oral) 16 5' 2.5\" (1.588 m) 150 lb 6.4 oz (68.2 kg) SpO2 BMI OB Status Smoking Status 100% 27.07 kg/m2 Hysterectomy Current Every Day Smoker BMI and BSA Data Body Mass Index Body Surface Area  
 27.07 kg/m 2 1.73 m 2 Preferred Pharmacy Pharmacy Name Phone [address], [address] [facility] [phone] Your Updated Medication List  
  
   
This list is accurate as of 10/8/18  5:24 PM.  Always use your most recent med list. amLODIPine 5 mg tablet Commonly known as:  Jr [place] Take 1 Tab by mouth daily. atorvastatin 40 mg tablet Commonly known as:  LIPITOR Take 1 Tab by mouth daily. CLARITIN 10 mg tablet Generic drug:  loratadine Take 10 mg by mouth daily. clopidogrel 75 mg Tab Commonly known as:  PLAVIX Take 1 Tab by mouth daily. hydroCHLOROthiazide 25 mg tablet Commonly known as:  HYDRODIURIL Take 25 mg by mouth daily. OMEGA 3-6-9 1,200 mg Cap Generic drug:  fish,bora,flax oils-om3,6,9no1 Take 1 Tab by mouth daily. Follow-up Instructions Return in about 4 weeks (around 11/5/2018), or if symptoms worsen or fail to improve, for medicare wellness. To-Do List   
 10/10/2018  8:00 PM  
(Arrive by 7:45 PM) Appointment with [address] at [facility] ([phone]) Please bring a list of ALL medications that you are currently taking (include prescription and over the counter medications). NO SHOW FEE:  Due to patient demand, and limited availability of appointments we have instituted a $100.00 no show fee. As of April 1, 2011, you must give a 24 hour notice in advance to avoid a no show fee.  Failure to do so within this limited time or failure to show up for scheduled appointments will result in a $100.00 fee charged to your account. The bill will be sent to the account desir and will not get charged to the insurance company. This is your responsibility and payment is expected immediately. 1. Please continue taking all prescribed medications unless otherwise instructed by the Sleep Lab. If you need to take medication while you are in the laboratory, bring them with you. We are an outpatient facility and we will not be able to provide any medications for you. Also bring a list of all current medications. 2.  Please DO NOT drink ANY alcohol on the day of your sleep study unless otherwise instructed by the Sleep Lab. 3.  Please DO NOT drink ANY stimulant (caffeinated) beverages AFTER 3:00pm; therefore, avoid coffee, tea, Coke, Pepsi, Mountain Dew, etc.       4.  Please DO NOT take ANY naps on the day of your study unless absolutely necessary. 5.  Please bring all necessary items needed for your stay in the laboratory (sleepwear, toothbrush, shampoo, favorite pillow, etc.). The laboratory supplies towels and soap. We suggest two-piece pajamas rather than a nightgown for female patients. Please remember to take your belongings with you the next morning. If you leave your belongings we will hold them for 2 weeks for you to come back and pick them up. After then we will donate them to [facility] ([facility]). 6.  Please shower and do not add any oils or lotions to your body or hair before coming into the lab. Use shampoo only. 7.  Please remember to wear your portable oxygen to the center if you wear it during the day. 8.  Check in at the [facility] located in the [facility]. Please arrive 15 minutes ahead of appointment time for orientation. Upon arrival at the laboratory, you will be assigned a private room and asked to complete several forms. Following this paperwork, you will prepare for bed and sensors will then be taped and glued to your scalp and body. There will be no electricity going through your body. We will record only the natural activity of your body. Also note that we will be monitoring you throughout the night with a low light camera, so as to identify different positions and for safety reasons  (i.e. to make sure you do not fall out of bed). TO CANCEL APPOINTMENTS, PLEASE CONTACT THE SLEEP LAB AT [phone]. To reschedule appointments, please contact Outpatient Scheduling at [phone]. Please arrive 30 minutes prior to appointment to register 10/11/2018  7:30 AM  
(Arrive by 7:00 AM) Appointment with [facility] at [facility] ([phone]) 1. Wash your hair the night before test.  Use only shampoo and water come to the lab with clean, dry hair. Do not use gels, mousse, hair spray or oils of any kind after washing your hair. Remove all extensions and or hair weaves. If not removed, we may not be able to do your study. 2.  Take all regular medication as scheduled, unless your doctor specifically tells you to stop. 3.  Continue to eat as usual.     4.  Bring a written list of all current medications including over the counter medications and any supplements you take. Please arrive 30 minutes prior to appaointment to register Patient Instructions FOR DIABETES:  
Work on low sugar diet outlined below Increase exercise. FOR LIGHTHEADEDNESS: 
Try to drink 64 ounces of water per day Try to cut back on soda/caffiene Low salt diet, less than 2 grams per 24 hours FOR WEIGHT: 
Continue to work on low sugar/carbohydrate diet for weight loss Exercise 30 minutes a day 4-5 days week Learning About Low-Carbohydrate Diets for Weight Loss What is a low-carbohydrate diet? Low-carb diets avoid foods that are high in carbohydrate.  These high-carb foods include pasta, bread, rice, cereal, fruits, and starchy vegetables. Instead, these diets usually have you eat foods that are high in fat and protein. Many people lose weight quickly on a low-carb diet. But the early weight loss is water. People on this diet often gain the weight back after they start eating carbs again. Not all diet plans are safe or work well. A lot of the evidence shows that low-carb diets aren't healthy. That's because these diets often don't include healthy foods like fruits and vegetables. Losing weight safely means balancing protein, fat, and carbs with every meal and snack. And low-carb diets don't always provide the vitamins, minerals, and fiber you need. If you have a serious medical condition, talk to your doctor before you try any diet. These conditions include kidney disease, heart disease, type 2 diabetes, high cholesterol, and high blood pressure. If you are pregnant, it may not be safe for your baby if you are on a low-carb diet. How can you lose weight safely? You might have heard that a diet plan helped another person lose weight. But that doesn't mean that it will work for you. It is very hard to stay on a diet that includes lots of big changes in your eating habits. If you want to get to a healthy weight and stay there, making healthy lifestyle changes will often work better than dieting. These steps can help. · Make a plan for change. Work with your doctor to create a plan that is right for you. · See a dietitian. He or she can show you how to make healthy changes in your eating habits. · Manage stress. If you have a lot of stress in your life, it can be hard to focus on making healthy changes to your daily habits. · Track your food and activity. You are likely to do better at losing weight if you keep track of what you eat and what you do. Follow-up care is a key part of your treatment and safety.  Be sure to make and go to all appointments, and call your doctor if you are having problems. It's also a good idea to know your test results and keep a list of the medicines you take. Where can you learn more? Go to http://adina-yamila.info/. Enter A121 in the search box to learn more about \"Learning About Low-Carbohydrate Diets for Weight Loss. \" Current as of: December 8, 2016 Content Version: 11.3 © 7319-8052 Tubaloo. Care instructions adapted under license by [employer] (which disclaims liability or warranty for this information). If you have questions about a medical condition or this instruction, always ask your healthcare professional. [address] any warranty or liability for your use of this information. Body Mass Index: Care Instructions Your Care Instructions Body mass index (BMI) can help you see if your weight is raising your risk for health problems. It uses a formula to compare how much you weigh with how tall you are. · A BMI lower than 18.5 is considered underweight. · A BMI between 18.5 and 24.9 is considered healthy. · A BMI between 25 and 29.9 is considered overweight. A BMI of 30 or higher is considered obese. If your BMI is in the normal range, it means that you have a lower risk for weight-related health problems. If your BMI is in the overweight or obese range, you may be at increased risk for weight-related health problems, such as high blood pressure, heart disease, stroke, arthritis or joint pain, and diabetes. If your BMI is in the underweight range, you may be at increased risk for health problems such as fatigue, lower protection (immunity) against illness, muscle loss, bone loss, hair loss, and hormone problems. BMI is just one measure of your risk for weight-related health problems.  You may be at higher risk for health problems if you are not active, you eat an unhealthy diet, or you drink too much alcohol or use tobacco products. Follow-up care is a key part of your treatment and safety. Be sure to make and go to all appointments, and call your doctor if you are having problems. It's also a good idea to know your test results and keep a list of the medicines you take. How can you care for yourself at home? · Practice healthy eating habits. This includes eating plenty of fruits, vegetables, whole grains, lean protein, and low-fat dairy. · If your doctor recommends it, get more exercise. Walking is a good choice. Bit by bit, increase the amount you walk every day. Try for at least 30 minutes on most days of the week. · Do not smoke. Smoking can increase your risk for health problems. If you need help quitting, talk to your doctor about stop-smoking programs and medicines. These can increase your chances of quitting for good. · Limit alcohol to 2 drinks a day for men and 1 drink a day for women. Too much alcohol can cause health problems. If you have a BMI higher than 25 · Your doctor may do other tests to check your risk for weight-related health problems. This may include measuring the distance around your waist. A waist measurement of more than 40 inches in men or 35 inches in women can increase the risk of weight-related health problems. · Talk with your doctor about steps you can take to stay healthy or improve your health. You may need to make lifestyle changes to lose weight and stay healthy, such as changing your diet and getting regular exercise. If you have a BMI lower than 18.5 · Your doctor may do other tests to check your risk for health problems. · Talk with your doctor about steps you can take to stay healthy or improve your health. You may need to make lifestyle changes to gain or maintain weight and stay healthy, such as getting more healthy foods in your diet and doing exercises to build muscle. Where can you learn more? Go to http://adina-yamila.info/. Enter S176 in the search box to learn more about \"Body Mass Index: Care Instructions. \" Current as of: October 13, 2016 Content Version: 11.4 © 2045-6626 Dustcloud. Care instructions adapted under license by [employer] (which disclaims liability or warranty for this information). If you have questions about a medical condition or this instruction, always ask your healthcare professional. [address] any warranty or liability for your use of this information. Introducing [facility]! Urmila Morales introduces [facility] patient portal. Now you can access parts of your medical record, email your doctor's office, and request medication refills online. 1. In your internet browser, go to [email or web address] 2. Click on the First Time User? Click Here link in the Sign In box. You will see the New Member Sign Up page. 3. Enter your [facility] Access Code exactly as it appears below. You will not need to use this code after youve completed the sign-up process. If you do not sign up before the expiration date, you must request a new code. · [employer] Access Code: [email or web address] Expires: 12/2/2018 10:50 PM 
 
4. Enter the last four digits of your Social Security Number (xxxx) and Date of Birth (mm/dd/yyyy) as indicated and click Submit. You will be taken to the next sign-up page. 5. Create a [facility] ID. This will be your [facility] login ID and cannot be changed, so think of one that is secure and easy to remember. 6. Create a [facility] password. You can change your password at any time. 7. Enter your Password Reset Question and Answer. This can be used at a later time if you forget your password. 8. Enter your e-mail address. You will receive e-mail notification when new information is available in [address] 9. Click Sign Up.  You can now view and download portions of your medical record. 10. Click the Download Summary menu link to download a portable copy of your medical information. If you have questions, please visit the Frequently Asked Questions section of the WineSimple website. Remember, WineSimple is NOT to be used for urgent needs. For medical emergencies, dial 911. Now available from your iPhone and Android! Please provide this summary of care documentation to your next provider. Your primary care clinician is listed as Jules Solano. If you have any questions after today's visit, please call [phone].

## 2018-10-08 NOTE — PATIENT INSTRUCTIONS
FOR DIABETES:  
Work on low sugar diet outlined below Increase exercise. FOR LIGHTHEADEDNESS: 
Try to drink 64 ounces of water per day Try to cut back on soda/caffiene Low salt diet, less than 2 grams per 24 hours FOR WEIGHT: 
Continue to work on low sugar/carbohydrate diet for weight loss Exercise 30 minutes a day 4-5 days week Learning About Low-Carbohydrate Diets for Weight Loss What is a low-carbohydrate diet? Low-carb diets avoid foods that are high in carbohydrate. These high-carb foods include pasta, bread, rice, cereal, fruits, and starchy vegetables. Instead, these diets usually have you eat foods that are high in fat and protein. Many people lose weight quickly on a low-carb diet. But the early weight loss is water. People on this diet often gain the weight back after they start eating carbs again. Not all diet plans are safe or work well. A lot of the evidence shows that low-carb diets aren't healthy. That's because these diets often don't include healthy foods like fruits and vegetables. Losing weight safely means balancing protein, fat, and carbs with every meal and snack. And low-carb diets don't always provide the vitamins, minerals, and fiber you need. If you have a serious medical condition, talk to your doctor before you try any diet. These conditions include kidney disease, heart disease, type 2 diabetes, high cholesterol, and high blood pressure. If you are pregnant, it may not be safe for your baby if you are on a low-carb diet. How can you lose weight safely? You might have heard that a diet plan helped another person lose weight. But that doesn't mean that it will work for you. It is very hard to stay on a diet that includes lots of big changes in your eating habits. If you want to get to a healthy weight and stay there, making healthy lifestyle changes will often work better than dieting. These steps can help. · Make a plan for change. Work with your doctor to create a plan that is right for you. · See a dietitian. He or she can show you how to make healthy changes in your eating habits. · Manage stress. If you have a lot of stress in your life, it can be hard to focus on making healthy changes to your daily habits. · Track your food and activity. You are likely to do better at losing weight if you keep track of what you eat and what you do. Follow-up care is a key part of your treatment and safety. Be sure to make and go to all appointments, and call your doctor if you are having problems. It's also a good idea to know your test results and keep a list of the medicines you take. Where can you learn more? Go to http://adina-yamila.info/. Enter A121 in the search box to learn more about \"Learning About Low-Carbohydrate Diets for Weight Loss. \" Current as of: December 8, 2016 Content Version: 11.3 © 4176-9596 Liquid5. Care instructions adapted under license by [employer] (which disclaims liability or warranty for this information). If you have questions about a medical condition or this instruction, always ask your healthcare professional. [address] any warranty or liability for your use of this information. Body Mass Index: Care Instructions Your Care Instructions Body mass index (BMI) can help you see if your weight is raising your risk for health problems. It uses a formula to compare how much you weigh with how tall you are. · A BMI lower than 18.5 is considered underweight. · A BMI between 18.5 and 24.9 is considered healthy. · A BMI between 25 and 29.9 is considered overweight. A BMI of 30 or higher is considered obese. If your BMI is in the normal range, it means that you have a lower risk for weight-related health problems.  If your BMI is in the overweight or obese range, you may be at increased risk for weight-related health problems, such as high blood pressure, heart disease, stroke, arthritis or joint pain, and diabetes. If your BMI is in the underweight range, you may be at increased risk for health problems such as fatigue, lower protection (immunity) against illness, muscle loss, bone loss, hair loss, and hormone problems. BMI is just one measure of your risk for weight-related health problems. You may be at higher risk for health problems if you are not active, you eat an unhealthy diet, or you drink too much alcohol or use tobacco products. Follow-up care is a key part of your treatment and safety. Be sure to make and go to all appointments, and call your doctor if you are having problems. It's also a good idea to know your test results and keep a list of the medicines you take. How can you care for yourself at home? · Practice healthy eating habits. This includes eating plenty of fruits, vegetables, whole grains, lean protein, and low-fat dairy. · If your doctor recommends it, get more exercise. Walking is a good choice. Bit by bit, increase the amount you walk every day. Try for at least 30 minutes on most days of the week. · Do not smoke. Smoking can increase your risk for health problems. If you need help quitting, talk to your doctor about stop-smoking programs and medicines. These can increase your chances of quitting for good. · Limit alcohol to 2 drinks a day for men and 1 drink a day for women. Too much alcohol can cause health problems. If you have a BMI higher than 25 · Your doctor may do other tests to check your risk for weight-related health problems. This may include measuring the distance around your waist. A waist measurement of more than 40 inches in men or 35 inches in women can increase the risk of weight-related health problems.  
· Talk with your doctor about steps you can take to stay healthy or improve your health. You may need to make lifestyle changes to lose weight and stay healthy, such as changing your diet and getting regular exercise. If you have a BMI lower than 18.5 · Your doctor may do other tests to check your risk for health problems. · Talk with your doctor about steps you can take to stay healthy or improve your health. You may need to make lifestyle changes to gain or maintain weight and stay healthy, such as getting more healthy foods in your diet and doing exercises to build muscle. Where can you learn more? Go to http://adina-yamila.info/. Enter S176 in the search box to learn more about \"Body Mass Index: Care Instructions. \" Current as of: October 13, 2016 Content Version: 11.4 © 9063-1116 Healthwise, Incorporated. Care instructions adapted under license by [employer] (which disclaims liability or warranty for this information). If you have questions about a medical condition or this instruction, always ask your healthcare professional. [address] any warranty or liability for your use of this information.

## 2018-10-08 NOTE — PROGRESS NOTES
Internal Medicine Follow Up Visit Note Chief Complaint Patient presents with  Follow-up  
  memory, vision HPI:  Myrtle Regan is a 58 y.o.  female with history significant for h/o CVA 9/2018 is here for the above complaint(s). Comes to clinic with son, Anirudh Tipton. Memory problems: problem with short and long term memory. Independent with clothing, bathing, feeding, toileting. Needs help with bills, doctors appointments, shopping and medications. Some anger outburst at home. No AVH. Some paranoia. Some depressed mood, no anxiety. No SI/HI. HH started since last visit. Positive syphilis test: No known h/o of syphillis. No ulcers, skin changes, rashes or heart symptoms including palpitations, chest pain, ect. Would like to know about diet restrictions: discussed with patient and son. Elevated LFT: No alcohol or tylenol. States medications make feel \"woozy\" but doesn't know which ones. Morning pills makes \"woozy\". Drinking 8 ounce bottles of water per day. S/P CVA Stroke 9/6-9/2018: found to have left occipital lobe acute infarct, occlusion of left proximal PCA, old infarct right frontal lobe and bilateral cerebellum infarcts. Has f/u with neurology Dr. Andrea Beebe 9/20/2018. Had complained of let sided headache, blurry vision, nausea, dizziness on admission and continued BV at time of discharge. Started on plavix 75 m g daily, statin, encouraged smoking cessation, norvasc for BP, potassium for low potassium. ST/PT/OT ordered during hospitalization but unable to find notes for ST for swallow evaluation, PT gave patient walker and HH ordered for SN/PT/OT/ST. Seen by [facility]  9/17/2018 with labs obtained K 3.7, ionized Ca 4.8, BUN/Cr 8/0.5, Hb/Hct 15/44, glucose 106, Sodium 141. Per patient and family home health has 9/11/2018 SN started with [facility]. OT came this week, PT came this week.  No ST yet. Since discharge pt reports symptoms of memory loss, blurry vision, dizziness about the same since d/c, LH \"loopiness\" better since d/c. No trouble walking. Feels some concentrating on swallowing, no coughing with water or food intake. Numbness \"slight\" in left leg, intermittent. [facility] care: Called [phone]. No ST order, SW order in place. Fax number [phone] order faxed for  cognitive eval and rehabilitation and swallow evaluation. Diabetes type 2: 6.6 A1c seen during recent hospitalization. A1c Repeat 6.5 10/2018, d/c metformin after last visit. Dyslipidemia: Continues on atorvastatin 40 mg in hospital.  No new myalgias since starting medications. HTN: Taking norvasc 5 mg daily, BP controlled today. better LH and unchanged dizziness since being on this medicine. Tobacco use disorder: not ready to quit. Discussed benefits of quitting with patient and informed to return when ready to discuss quitting plan. Current Outpatient Prescriptions Medication Sig  clopidogrel (PLAVIX) 75 mg tab Take 1 Tab by mouth daily.  amLODIPine (NORVASC) 5 mg tablet Take 1 Tab by mouth daily.  atorvastatin (LIPITOR) 40 mg tablet Take 1 Tab by mouth daily.  hydroCHLOROthiazide (HYDRODIURIL) 25 mg tablet Take 25 mg by mouth daily.  fish,bora,flax oils-om3,6,9 #1 (OMEGA 3-6-9) 1,200 mg cap Take 1 Tab by mouth daily.  loratadine (CLARITIN) 10 mg tablet Take 10 mg by mouth daily. No current facility-administered medications for this visit. Health Maintenance Topic Date Due  Pneumococcal 19-64 Medium Risk (1 of 1 - PPSV23) 11/12/1974  
 DTaP/Tdap/Td series (1 - Tdap) 11/12/1976  PAP AKA CERVICAL CYTOLOGY  11/12/1976  Shingrix Vaccine Age 50> (1 of 2) 11/12/2005  BREAST CANCER SCRN MAMMOGRAM  11/12/2005  FOBT Q 1 YEAR AGE 50-75  11/12/2005  MEDICARE YEARLY EXAM  03/20/2018  Influenza Age 5 to Adult  08/01/2018  Hepatitis C Screening  Completed There is no immunization history on file for this patient. Allergies and Medications: Reviewed and updated in EMR. Patient Active Problem List  
Diagnosis Code  History of CVA (cerebrovascular accident) Z80.78  
 Hx of ischemic left PCA stroke Z86.73  
 Abnormal albumin R77.0  Anemia D64.9  Hypertension I10  Tobacco use disorder F17.200  Environmental and seasonal allergies J30.89  Memory problem R41.3  History of Graves' disease Z86.39  
 Visual field constriction, bilateral W91.479  Blurry vision H53.8  Cataract H26.9  Arthritis M19.90  
 Urinary incontinence R32  Memory loss R41.3 Family History, Social History, Past Medical History, Surgical History: Reviewed and updated in EMR as appropriate. OBJECTIVE:  
Visit Vitals  /72  Pulse 72  Temp 98.5 °F (36.9 °C) (Oral)  Resp 16  
 Ht 5' 2.5\" (1.588 m)  Wt 150 lb 6.4 oz (68.2 kg)  SpO2 100%  BMI 27.07 kg/m2 BP Readings from Last 3 Encounters:  
10/08/18 111/72  
09/28/18 132/80  
09/19/18 112/70 Wt Readings from Last 3 Encounters:  
10/08/18 150 lb 6.4 oz (68.2 kg) 09/28/18 155 lb (70.3 kg) 09/19/18 152 lb 9.6 oz (69.2 kg) General: Pleasant adult woman sitting comfortably in no acute distress HEENT: Head is atraumatic normo-cephalic. EXT: 2+ dorsalis pedis pulses bilaterally. No pedal edema bilaterally Neuro: Alert and oriented x 3. Gait wnl. No focal muscle weakness appreciated on gross visualization. MSE: mood euthymic, affect congruent and reactive. MOCA: 22/30 3/5 visual spatial, 0/5 delayed recall, 5/6 orientation. Nursing Notes Reviewed. LABS/RADIOLOGICAL TESTS: 
 
Lab Results Component Value Date/Time WBC 10.8 10/03/2018 11:15 AM  
 HGB 12.1 10/03/2018 11:15 AM  
 HCT 38.9 10/03/2018 11:15 AM  
 PLATELET 238 21/83/1276 11:15 AM  
 
Lab Results Component Value Date/Time  Sodium 142 10/03/2018 11:15 AM  
 Potassium 3.7 10/03/2018 11:15 AM  
 Chloride 101 10/03/2018 11:15 AM  
 CO2 28 10/03/2018 11:15 AM  
 Glucose 110 (H) 10/03/2018 11:15 AM  
 BUN 8 10/03/2018 11:15 AM  
 Creatinine 0.57 (L) 10/03/2018 11:15 AM  
 
Lab Results Component Value Date/Time Cholesterol, total 185 09/07/2018 05:02 AM  
 HDL Cholesterol 38 (L) 09/07/2018 05:02 AM  
 LDL, calculated 123 09/07/2018 05:02 AM  
 Triglyceride 120 09/07/2018 05:02 AM  
 
 
10/4/2018  3:21 PM - [facility], Lab In [facility]  
Component Results Component RPR titer 1:1  
 
10/4/2018  3:21 PM - [facility], Lab In [facility]  
Component Results Component Value Flag Ref Range Units Status RPR Confirmed Reactive (A) NR   Final  
 
10/6/2018  4:00 PM - [facility], [facility]  
Component Results Component Value Flag Ref Range Units Status Microalbumin,urine random 1.20  0 - 3.0 MG/DL Final  
Creatinine, urine 195.15 (H) 30 - 125 mg/dL Final  
Microalbumin/Creat ratio (mg/g creat) 6  0 - 30 mg/g Final  
Lab and Collection MICROALBUMIN, UR, RAND W/ MICROALB/CREAT RATIO (Order #[id number]) on 10/5/2018 - Lab and Collection Information Report Information Status Final result (Resulted: 10/6/2018  4:00 PM) Lab Information Lab  
[facility] Dr. Johnson Cardenas. Gray Dowell,  [address] [place] [address] How to build your own SmartLinks MICROALBUMIN, UR, RAND W/ MICROALB/CREAT RATIO (Order #[id number]) on 10/5/18 Order MICROALBUMIN, UR, RAND W/ MICROALB/CREAT RATIO [[id number]] (Order [id number]) Reprint Requisition to Label Paper MICROALBUMIN, UR, RAND W/ MICROALB/CREAT RATIO (Order #[id number]) on 10/5/18 Order Providers Authorizing Encounter [facility] LAB OUTREACH INSURANCE Order Information Order Date/Time Release Date/Time Start Date/Time End Date/Time 10/05/18 08:37 PM 10/05/18 08:37 PM 10/05/18 08:37 PM 10/5/2018 Order Details Frequency Duration Priority Order Class ONE TIME 1  occurrence Routine Hospital Lab Reprint OrderRequisition - Outpatient Only MICROALBUMIN, UR, RAND W/ MICROALB/CREAT RATIO (Order #[id number]) on 10/5/18 Associated Diagnoses ICD-10-CM ICD-9-CM Elevated hemoglobin A1c   
 R73.09 790.29 Original Order Ordered On Ordered By  
  
9/19/2018 12:58 PM Tereasa Duverney, MD   
    
Collection Information Accession #: [id number] SpecimenType: Urine Urine, random Collected: 10/5/2018  1:00 PM  
 Resulting Agency: [facility] Appointments for this Order 10/5/2018 8:40 PM - 5 min [facility] LAB OUTREACH INSURANCE (Resource) [employer] Additional Information Associated Reports View Encounter Priority and Order Details Collection Information Medication Detail Disp Refills Start End  
  
MICROALBUMIN, UR, RAND W/ MICROALB/CREAT RATIO   10/5/2018 10/5/2018 Sig: One time. Class: Hospital Lab   
 
10/3/2018 10:46 PM - [facility], Lab In [facility]  
Component Results Component Value Flag Ref Range Units Status Folate 5.8  3.10 - 17.50 ng/mL Final  
 
10/4/2018  3:20 PM - [facility], Lab In [facility]  
Component Results Component Value Flag Ref Range Units Status T. pallidum interpretation. PRELIMINARY REACTIVE (A) NR   Final  
 
10/4/2018 10:24 AM - [facility], Lab In [facility]  
Component Results Component Value Flag Ref Range Units Status Hepatitis C virus Ab 0.06  <0.80 Index Final  
Hep C  virus Ab Interp. NEGATIVE   NEG   Final  
Hep C  virus Ab comment         Final  
 
10/3/2018 10:46 PM - [facility], Lab [facility]  
Component Results Component Value Flag Ref Range Units Status Sodium 142  136 - 145 mmol/L Final  
Potassium 3.7  3.5 - 5.5 mmol/L Final  
Chloride 101  100 - 108 mmol/L Final  
CO2 28  21 - 32 mmol/L Final  
Anion gap 13  3.0 - 18 mmol/L Final  
Glucose 110 (H) 74 - 99 mg/dL Final  
BUN 8  7.0 - 18 MG/DL Final  
Creatinine 0.57 (L) 0.6 - 1.3 MG/DL Final  
 BUN/Creatinine ratio 14  12 - 20   Final  
GFR est AA >60  >60 ml/min/1.73m2 Final  
GFR est non-AA >60  >60 ml/min/1.73m2 Final  
Comment:  
Calcium 9.2  8.5 - 10.1 MG/DL Final  
Bilirubin, total 0.2  0.2 - 1.0 MG/DL Final  
ALT (SGPT) 60 (H) 13 - 56 U/L Final  
AST (SGOT) 53 (H) 15 - 37 U/L Final  
Alk. phosphatase 144 (H) 45 - 117 U/L Final  
Protein, total 7.8  6.4 - 8.2 g/dL Final  
Albumin 3.6  3.4 - 5.0 g/dL Final  
Globulin 4.2 (H) 2.0 - 4.0 g/dL Final  
A-G Ratio 0.9  0.8 - 1.7   Final  
 
10/3/2018 10:46 PM - [facility], Lab In [facility]  
Component Results Component Value Flag Ref Range Units Status Iron 46 (L) 50 - 175 ug/dL Final  
Comment:  
Patients receiving metal-binding drugs (e.g. deferoxamine) may show spuriously depressed iron values, as chelated iron may not properly react in the iron assay. TIBC 352  250 - 450 ug/dL Final  
Iron % saturation 13   % Final  
 
10/3/2018 10:46 PM - [facility], Lab In [facility]  
Component Results Component Value Flag Ref Range Units Status Ferritin 68  8 - 388 NG/ML Final  
 
10/3/2018 10:46 PM - [facility]te, Lab In [facility]  
Component Results Component Value Flag Ref Range Units Status Vitamin B12 422  211 - 911 pg/mL Final  
 
All lab results and radiological studies were reviewed and discussed with the patient. ASSESSMENT/PLAN:   
  ICD-10-CM ICD-9-CM 1. Memory loss R41.3 780.93 Continue current mgmt Encouraged help from son 2. 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  
3. History of CVA (cerebrovascular accident) Z86.73 V12.54 Continue current mgmt F/u with neurology 4. Overweight (BMI 25.0-29. 9) E66.3 278.02 Diet and exercise plan discussed BMI education provided 5. Essential hypertension I10 401.9 Continue current mgmt 6. Tobacco use disorder F17.200 305.1 Continue to encourage cessation, pt precontemplative 7. Elevated LFTs R94.5 790.6 recheck Requested Prescriptions No prescriptions requested or ordered in this encounter Patient verbalized understanding and agreement with the plan. Patient was given an after-visit summary. Follow-up Disposition: 
Return in about 4 weeks (around 11/5/2018), or if symptoms worsen or fail to improve, for medicare wellness. or sooner if worsening symptoms. More than 50% of this 45 min visit was spent counseling the patient face to face about etiology and treatment of health conditions outlined in assessment and plan. Cathleen Guaman M.D. [address], [address] [place], [address] [place] [address] [phone] Fax - [phone]

## 2018-10-09 PROBLEM — E11.9 CONTROLLED TYPE 2 DIABETES MELLITUS WITHOUT COMPLICATION, WITHOUT LONG-TERM CURRENT USE OF INSULIN (HCC): Status: ACTIVE | Noted: 2018-10-09

## 2018-10-10 ENCOUNTER — TELEPHONE (OUTPATIENT)
Dept: FAMILY MEDICINE CLINIC | Age: 63
End: 2018-10-10

## 2018-10-11 RX ORDER — ATORVASTATIN CALCIUM 40 MG/1
40 TABLET, FILM COATED ORAL DAILY
Qty: 30 TAB | Refills: 0 | Status: SHIPPED | OUTPATIENT
Start: 2018-10-11 | End: 2018-11-03 | Stop reason: SDUPTHER

## 2018-10-11 RX ORDER — HYDROCHLOROTHIAZIDE 25 MG/1
25 TABLET ORAL DAILY
Qty: 90 TAB | Refills: 3 | Status: SHIPPED | OUTPATIENT
Start: 2018-10-11 | End: 2018-11-14 | Stop reason: SDUPTHER

## 2018-10-11 RX ORDER — AMLODIPINE BESYLATE 5 MG/1
5 TABLET ORAL DAILY
Qty: 30 TAB | Refills: 0 | Status: SHIPPED | OUTPATIENT
Start: 2018-10-11 | End: 2018-11-03 | Stop reason: SDUPTHER

## 2018-10-11 NOTE — TELEPHONE ENCOUNTER
Per pt's son Mayela Lozano states pt also need a refill on medication lisinopril 75 mg tab. States at last appt pt told Dr Kahlil Bhatia she is allergic to penciliin. Mayela Lozano states pt is not allergic to penicillin, States medication can be rx'd as well as doxycycline. Confirmed pharmacy on file [facility] [place]. Requested Prescriptions     Pending Prescriptions Disp Refills    amLODIPine (NORVASC) 5 mg tablet 30 Tab 0     Sig: Take 1 Tab by mouth daily.  atorvastatin (LIPITOR) 40 mg tablet 30 Tab 0     Sig: Take 1 Tab by mouth daily.  hydroCHLOROthiazide (HYDRODIURIL) 25 mg tablet       Sig: Take 1 Tab by mouth daily.

## 2018-10-15 ENCOUNTER — TELEPHONE (OUTPATIENT)
Dept: FAMILY MEDICINE CLINIC | Age: 63
End: 2018-10-15

## 2018-10-15 DIAGNOSIS — A53.9 SYPHILIS (ACQUIRED): Primary | ICD-10-CM

## 2018-10-15 NOTE — TELEPHONE ENCOUNTER
Called to discuss treatment for syphilis. Left VM for patient to return call. Awaiting call back. Needs LP to r/o neurosyphilis. Called son Yane Valentina. PCN allergy noted in past chart per son no Penicillin allergy. Son returned call and did not state lisinopril was needed. Clopidogrel 75 mg daily. No issues with ampicillin in past. Will place urgent referral for ID to evaluate and give treatment recommendations. If PCN allergy will need to re-challenge. If no PCN allergy treatment for neurosyphilis is different from non neurosyphilis. Called regi Frye to clarify \"lisinopril 76 mg\" prescription refill request. [phone]. Awaiting call back from Sue Frye. 10/4/2018  3:20 PM - [facility], Lab In [facility]   Component Results   Component Value Flag Ref Range Units Status   T. pallidum interpretation. PRELIMINARY REACTIVE (A) NR   Final   Lab and Collectio  10/4/2018  3:21 PM - [facility], Lab In [facility]   Component Results   Component Value Flag Ref Range Units Status   RPR Confirmed Reactive (A) NR   Final   Lab and Collection  10/4/2018  3:21 PM - [facility]te, Lab In [facility]   Component Results   Component   RPR titer   1:1   Lab and Collection   RPR, TITER (Order #[id number]) on 10/3/2018 - Kimberly and Sivakumar Angelo M.D.   [facility]  [address] [place] [address], [address] [place] [address] [address] [place] [address]   [phone]

## 2018-10-16 ENCOUNTER — TELEPHONE (OUTPATIENT)
Dept: FAMILY MEDICINE CLINIC | Age: 63
End: 2018-10-16

## 2018-10-16 ENCOUNTER — DOCUMENTATION ONLY (OUTPATIENT)
Dept: FAMILY MEDICINE CLINIC | Age: 63
End: 2018-10-16

## 2018-10-16 DIAGNOSIS — A53.9 SYPHILIS: Primary | ICD-10-CM

## 2018-10-16 NOTE — TELEPHONE ENCOUNTER
Pt's son Rosenda Evans called and would like to receive a call back in regard to pt's spinal testing to be done. Pt's son stated pt would like to have testing done sometime this week but they have a few questions they would like to address in regard to testing.  Please return Sujey call at    [phone]

## 2018-10-16 NOTE — TELEPHONE ENCOUNTER
Taylor nurse with [facility] Radiology dept states pt is scheduled for xray for lumbar puncture. States need order for pt to stop Plavix for 5 days prior to xray. States please fax to [phone].

## 2018-10-16 NOTE — TELEPHONE ENCOUNTER
[facility] called to discuss positive syphilis testing. Per ID patient scheduled for evaluation 10/19/2018 3PM NP BECKY Douglas, request LP be completed before visit. Called son Brady Yeh to inform IR guided LP scheduled at [facility] for 10/22/2018 at 2:30 PM. Nothing to eat or drink 4 hours before test. Day prior needs to start drinking 64 ounces of water 24 hours prior to testing. Hold plavix 5 days before procedure. May need to stay 2-4 hours to be observed, needs . Bring list of medications. Arrive 1 hour before test at 1:30PM.       Federico Sweeney M.D.   [facility]  [address] [place] [address], [address] [place] [address], [address] [place] [address] [phone]  [facility] -   [phone]

## 2018-10-16 NOTE — TELEPHONE ENCOUNTER
LPN please call Severo Griffes from [facility] radiology and inform that patient is aware of need to hold plavix for 5 days prior to procedure. Okay to give verbal order to [facility] from MD stating okay to hold plavix for 5 days prior to procedure. Unable to place order in [facility] stating such. Meng Lunsford M.D.   [address], [address] [place] [address] [phone]  YD -   [phone]

## 2018-10-18 NOTE — TELEPHONE ENCOUNTER
[facility]  Sarah Gomez NP  [address] [facility], [address] [place] [address] Road  Phone: [phone]  Fax: [phone]    Faxed notes, confirmation received.   Appointment 10/19/18 at 3:00 with provider Carlos Garces NP

## 2018-10-18 NOTE — TELEPHONE ENCOUNTER
Spoke to Missy and informed her that patient is aware to stop Plavix 5 days prior to her procedure. Taylor verbalized understanding.

## 2018-10-21 PROBLEM — R29.818 SUSPECTED SLEEP APNEA: Status: ACTIVE | Noted: 2018-10-21

## 2018-10-22 PROBLEM — R79.89 ELEVATED TSH: Status: ACTIVE | Noted: 2018-10-22

## 2018-10-23 ENCOUNTER — TELEPHONE (OUTPATIENT)
Dept: FAMILY MEDICINE CLINIC | Age: 63
End: 2018-10-23

## 2018-10-23 NOTE — TELEPHONE ENCOUNTER
Called from [facility] reported ID specialist reported cancel LP given number for syphilis lab levels too low. Mert Devi M.D.   [address], [address] [place] [address]   [facility] [phone]  [phone]

## 2018-11-04 RX ORDER — AMLODIPINE BESYLATE 5 MG/1
TABLET ORAL
Qty: 30 TAB | Refills: 0 | Status: SHIPPED | OUTPATIENT
Start: 2018-11-04 | End: 2018-11-14 | Stop reason: SDUPTHER

## 2018-11-04 RX ORDER — ATORVASTATIN CALCIUM 40 MG/1
TABLET, FILM COATED ORAL
Qty: 30 TAB | Refills: 0 | Status: SHIPPED | OUTPATIENT
Start: 2018-11-04 | End: 2018-11-14 | Stop reason: SDUPTHER

## 2018-11-14 ENCOUNTER — TELEPHONE (OUTPATIENT)
Dept: FAMILY MEDICINE CLINIC | Age: 63
End: 2018-11-14

## 2018-11-14 RX ORDER — CLOPIDOGREL BISULFATE 75 MG/1
75 TABLET ORAL DAILY
Qty: 90 TAB | Refills: 1 | Status: SHIPPED | OUTPATIENT
Start: 2018-11-14 | End: 2018-12-05 | Stop reason: SDUPTHER

## 2018-11-14 RX ORDER — ATORVASTATIN CALCIUM 40 MG/1
40 TABLET, FILM COATED ORAL DAILY
Qty: 90 TAB | Refills: 3 | Status: SHIPPED | OUTPATIENT
Start: 2018-11-14 | End: 2018-12-05 | Stop reason: SDUPTHER

## 2018-11-14 RX ORDER — AMLODIPINE BESYLATE 5 MG/1
5 TABLET ORAL DAILY
Qty: 90 TAB | Refills: 3 | Status: SHIPPED | OUTPATIENT
Start: 2018-11-14 | End: 2018-12-05 | Stop reason: SDUPTHER

## 2018-11-14 RX ORDER — HYDROCHLOROTHIAZIDE 25 MG/1
25 TABLET ORAL DAILY
Qty: 90 TAB | Refills: 3 | Status: SHIPPED | OUTPATIENT
Start: 2018-11-14 | End: 2018-12-05 | Stop reason: SDUPTHER

## 2018-11-14 NOTE — TELEPHONE ENCOUNTER
Rx sent to mail order daniel. Veronica Wood M.D.   [facility]  [address] [facility], [address] [place] [address], [address] [place] [address] -   [place] [phone]  [phone]

## 2018-11-19 ENCOUNTER — OFFICE VISIT (OUTPATIENT)
Dept: FAMILY MEDICINE CLINIC | Age: 63
End: 2018-11-19

## 2018-11-19 ENCOUNTER — TELEPHONE (OUTPATIENT)
Dept: FAMILY MEDICINE CLINIC | Age: 63
End: 2018-11-19

## 2018-11-19 VITALS
BODY MASS INDEX: 26.4 KG/M2 | WEIGHT: 149 LBS | DIASTOLIC BLOOD PRESSURE: 76 MMHG | HEART RATE: 91 BPM | RESPIRATION RATE: 18 BRPM | SYSTOLIC BLOOD PRESSURE: 126 MMHG | OXYGEN SATURATION: 98 % | TEMPERATURE: 96.8 F | HEIGHT: 63 IN

## 2018-11-19 DIAGNOSIS — Z00.00 MEDICARE ANNUAL WELLNESS VISIT, SUBSEQUENT: ICD-10-CM

## 2018-11-19 DIAGNOSIS — Z12.11 COLON CANCER SCREENING: ICD-10-CM

## 2018-11-19 DIAGNOSIS — E11.9 CONTROLLED TYPE 2 DIABETES MELLITUS WITHOUT COMPLICATION, WITHOUT LONG-TERM CURRENT USE OF INSULIN (HCC): ICD-10-CM

## 2018-11-19 DIAGNOSIS — Z86.73 HX OF ISCHEMIC LEFT PCA STROKE: Primary | ICD-10-CM

## 2018-11-19 DIAGNOSIS — Z12.39 BREAST CANCER SCREENING: ICD-10-CM

## 2018-11-19 NOTE — PATIENT INSTRUCTIONS
Please bring complete advanced care planning form back when completed Go to "[employer] to get review of glasses with optometrist 
 
Ask ophthalmologist Dr. Lilian Doran for exam and ask them to do diabetes retinopathy screening We are sending a referral to [facility] for breast cancer screening with mammogram . You should be called about this in 1-2 weeks. If no word in 2 weeks please give us a call to follow up Schedule well woman exam when ready to get pap smear Okay to take tylenol 325 mg as needed for headaches Schedule of Personalized Health Plan The best way to stay healthy is to live a healthy lifestyle. A healthy lifestyle includes regular exercise, eating a well-balanced diet, keeping a healthy weight and not smoking. Regular physical exams and screening tests are another important way to take care of yourself. Preventive exams provided by health care providers can find health problems early when treatment works best and can keep you from getting certain diseases or illnesses. Preventive services include exams, lab tests, screenings, shots, monitoring and information to help you take care of your own health. All people over 65 should have a pneumonia shot. Pneumonia shots are usually only needed once in a lifetime unless your doctor decides differently. All people over 65 should have a yearly flu shot. People over 65 are at medium to high risk for Hepatitis B. Three shots are needed for complete protection. In addition to your physical exam, some screening tests are recommended: 
 
Bone mass measurement (dexa scan) is recommended every two years Diabetes Mellitus screening is recommended every year. Glaucoma is an eye disease caused by high pressure in the eye. An eye exam is recommended every year. Cardiovascular screening tests that check your cholesterol and other blood fat (lipid) levels are recommended every five years. Colorectal Cancer screening tests help to find pre-cancerous polyps (growths in the colon) so they can be removed before they turn into cancer. Tests ordered for screening depend on your personal and family history risk factors. Screening for Breast Cancer is recommended yearly with a mammogram. 
 
Screening for Cervical Cancer is recommended every two years (annually for certain risk factors, such as previous history of STD or abnormal PAP in past 7 years), with a Pelvic Exam with PAP Here is a list of your current Health Maintenance items with a due date: 
Health Maintenance Topic Date Due  
 EYE EXAM RETINAL OR DILATED Q1  11/12/1965  PAP AKA CERVICAL CYTOLOGY  11/12/1976  BREAST CANCER SCRN MAMMOGRAM  11/12/2005  FOBT Q 1 YEAR AGE 50-75  11/12/2005  Influenza Age 5 to Adult  05/03/2019 (Originally 8/1/2018)  Pneumococcal 19-64 Medium Risk (1 of 1 - PPSV23) 11/19/2019 (Originally 11/12/1974)  DTaP/Tdap/Td series (1 - Tdap) 11/19/2019 (Originally 11/12/1976)  Shingrix Vaccine Age 50> (1 of 2) 11/19/2019 (Originally 11/12/2005)  HEMOGLOBIN A1C Q6M  04/03/2019  LIPID PANEL Q1  09/07/2019  MICROALBUMIN Q1  10/05/2019  
 FOOT EXAM Q1  11/19/2019  MEDICARE YEARLY EXAM  11/20/2019  Hepatitis C Screening  Completed

## 2018-11-19 NOTE — TELEPHONE ENCOUNTER
Pt gave copy of referral order from Dr Cl Gonzalez. States Dr Luis Enrique Mckeon referred her to cardiology Dr Remo Gowers appt 11/27/2018 and to Dr Mio Narayanan neuro OPhthamology 12/03/18.

## 2018-11-19 NOTE — PROGRESS NOTES
Internal Medicine Follow Up Visit Note Chief Complaint Patient presents with [facility] Annual Wellness Visit HPI:  Roselyn Hawkins is a 61 y.o.  female with history significant for history of CVA, Type 2 DM is here for the above complaint(s). Here for medicare wellness visit Flu shot: declines vaccines. Colon Cancer Screening: Does not recall getting done in past.  
CT scan Lung Cancer Screen: Declines screening Hepatitis C: negative screening in past 
Glaucoma: 7/2018 completed Osteoporosis Screen: completed many years Mammogram Screen: last completed > 1 year ago Pap Smear: last completed 5-6 years ago, no h/o abnormal pap smears. Menopause with partial hysterectomy at least 10 year ago. Diabetes: diet controlled. Current Outpatient Medications Medication Sig  
 amLODIPine (NORVASC) 5 mg tablet Take 1 Tab by mouth daily.  atorvastatin (LIPITOR) 40 mg tablet Take 1 Tab by mouth daily.  clopidogrel (PLAVIX) 75 mg tab Take 1 Tab by mouth daily for 180 days.  hydroCHLOROthiazide (HYDRODIURIL) 25 mg tablet Take 1 Tab by mouth daily.  fish,bora,flax oils-om3,6,9 #1 (OMEGA 3-6-9) 1,200 mg cap Take 1 Tab by mouth daily.  loratadine (CLARITIN) 10 mg tablet Take 10 mg by mouth daily. No current facility-administered medications for this visit. Health Maintenance Topic Date Due  
 EYE EXAM RETINAL OR DILATED Q1  11/12/1965  PAP AKA CERVICAL CYTOLOGY  11/12/1976  BREAST CANCER SCRN MAMMOGRAM  11/12/2005  FOBT Q 1 YEAR AGE 50-75  11/12/2005  Influenza Age 5 to Adult  05/03/2019 (Originally 8/1/2018)  Pneumococcal 19-64 Medium Risk (1 of 1 - PPSV23) 11/19/2019 (Originally 11/12/1974)  DTaP/Tdap/Td series (1 - Tdap) 11/19/2019 (Originally 11/12/1976)  Shingrix Vaccine Age 50> (1 of 2) 11/19/2019 (Originally 11/12/2005)  HEMOGLOBIN A1C Q6M  04/03/2019  LIPID PANEL Q1  09/07/2019  MICROALBUMIN Q1  10/05/2019  FOOT EXAM Q1  11/19/2019  MEDICARE YEARLY EXAM  11/20/2019  Hepatitis C Screening  Completed There is no immunization history on file for this patient. Allergies and Medications: Reviewed and updated in EMR. Patient Active Problem List  
Diagnosis Code  History of CVA (cerebrovascular accident) Z80.78  
 Hx of ischemic left PCA stroke Z86.73  
 Abnormal albumin R77.0  Anemia D64.9  Hypertension I10  Tobacco use disorder F17.200  Environmental and seasonal allergies J30.89  Memory problem R41.3  History of Graves' disease Z86.39  
 Visual field constriction, bilateral Z88.638  Blurry vision H53.8  Cataract H26.9  Arthritis M19.90  
 Urinary incontinence R32  Memory loss R41.3  Controlled type 2 diabetes mellitus without complication, without long-term current use of insulin (HCC) E11.9  Syphilis (acquired) A53.9  Suspected sleep apnea R29.818  Elevated TSH R79.89 Family History, Social History, Past Medical History, Surgical History: Reviewed and updated in EMR as appropriate. OBJECTIVE:  
Visit Vitals /76 Pulse 91 Temp 96.8 °F (36 °C) (Oral) Resp 18 Ht 5' 2.5\" (1.588 m) Wt 149 lb (67.6 kg) SpO2 98% BMI 26.82 kg/m² BP Readings from Last 3 Encounters:  
11/19/18 126/76  
10/08/18 111/72  
09/28/18 132/80 Wt Readings from Last 3 Encounters:  
11/19/18 149 lb (67.6 kg) 10/08/18 150 lb 6.4 oz (68.2 kg) 09/28/18 155 lb (70.3 kg) General: Pleasant adult woman in no acute distress HEENT: Head is atraumatic normo-cephalic. Neuro: Alert and oriented x3. Gait wnl. MSE: mood euthymic, affect congruent and reactive. Diabetic Foot exam: 2+ dorsalis pedis pulses bilaterally. Positive monofilament in all 10 toes and bottoms of both feet. Vibratory sense intact bilateral MTP joints. Joint poisition sense intact bilateral first digits.  Skin negative for ulcerative/plaque lesions, signs of infection, or other visual foot abnormalities. Nursing Notes Reviewed. LABS/RADIOLOGICAL TESTS: 
 
Lab Results Component Value Date/Time WBC 10.8 10/03/2018 11:15 AM  
 HGB 12.1 10/03/2018 11:15 AM  
 HCT 38.9 10/03/2018 11:15 AM  
 PLATELET 264 41/59/7116 11:15 AM  
 
Lab Results Component Value Date/Time Sodium 142 10/03/2018 11:15 AM  
 Potassium 3.7 10/03/2018 11:15 AM  
 Chloride 101 10/03/2018 11:15 AM  
 CO2 28 10/03/2018 11:15 AM  
 Glucose 110 (H) 10/03/2018 11:15 AM  
 BUN 8 10/03/2018 11:15 AM  
 Creatinine 0.57 (L) 10/03/2018 11:15 AM  
 
Lab Results Component Value Date/Time Cholesterol, total 185 09/07/2018 05:02 AM  
 HDL Cholesterol 38 (L) 09/07/2018 05:02 AM  
 LDL, calculated 123 09/07/2018 05:02 AM  
 Triglyceride 120 09/07/2018 05:02 AM  
 
10/6/2018  4:00 PM - [place], Lab In Last 2 Left  
 
Component Value Flag Ref Range Units Status Microalbumin,urine random 1.20   0 - 3.0 MG/DL Final  
Creatinine, urine 195.15 Abnormally high   H  30 - 125 mg/dL Final  
Microalbumin/Creat ratio (mg/g creat) 6   0 - 30 mg/g Final  
 
10/3/2018 10:30 PM - [place], Lab In Last 2 Left  
 
Component Value Flag Ref Range Units Status Hemoglobin A1c 6.5 Abnormally high   H  4.2 - 5.6 % Final  
 
 
All lab results and radiological studies were reviewed and discussed with the patient. ASSESSMENT/PLAN:   
  ICD-10-CM ICD-9-CM 1. Hx of ischemic left PCA stroke Z86.73 V12.54 F/u with neurology Continue current mgmt 2. Controlled type 2 diabetes mellitus without complication, without long-term current use of insulin (HCC) E11.9 250.00 Continue diet and exercise Encouraged to get retionpathy screening with ophthalmology 3. Colon cancer screening Z12.11 V76.51 OCCULT BLOOD IMMUNOASSAY,DIAGNOSTIC 4. Breast cancer screening Z12.31 V76.10 JASSI MAMMO BI SCREENING INCL CAD 5. Medicare annual wellness visit, subsequent Z00.00 V70.0 Completed today ACP form given and discussed AVS with HCM plan given to patient Requested Prescriptions No prescriptions requested or ordered in this encounter Patient verbalized understanding and agreement with the plan. Patient was given an after-visit summary. Follow-up Disposition: 
Return in about 6 months (around 5/19/2019) for well woman, f/u DM. or sooner if worsening symptoms. More than 50% of this 40 min visit was spent counseling the patient face to face about etiology and treatment of health conditions outlined in assessment and plan. Mayra Smith M.D. [address], [address] [place], [address] [place] [address] [phone] Fax - [phone] or [phone] MEDICARE WELLNESS NOTE BELOW Rosario Boyd is a 61 y.o. female and presents for annual Medicare Wellness Visit. Problem List: Reviewed with patient and discussed risk factors. Patient Active Problem List  
Diagnosis Code  History of CVA (cerebrovascular accident) Z80.78  
 Hx of ischemic left PCA stroke Z86.73  
 Abnormal albumin R77.0  Anemia D64.9  Hypertension I10  Tobacco use disorder F17.200  Environmental and seasonal allergies J30.89  Memory problem R41.3  History of Graves' disease Z86.39  
 Visual field constriction, bilateral K58.116  Blurry vision H53.8  Cataract H26.9  Arthritis M19.90  
 Urinary incontinence R32  Memory loss R41.3  Controlled type 2 diabetes mellitus without complication, without long-term current use of insulin (HCC) E11.9  Syphilis (acquired) A53.9  Suspected sleep apnea R29.818  Elevated TSH R79.89 Current medical providers:  Patient Care Team: 
Ines Thornton MD as PCP - General (Internal Medicine) Teretha Skiff, MD as Physician (Neurology) Alysa Roland MD as Physician (Cardiology) Jose Luis Flores MD as Physician (Ophthalmology) PSH: Reviewed with patient Past Surgical History:  
Procedure Laterality Date  HX HIP REPLACEMENT Bilateral   
 multiple revision on left 3  
 HX HYSTERECTOMY 2/2 unclear reasons, not for cancer SH: Reviewed with patient Social History Tobacco Use  Smoking status: Current Every Day Smoker Packs/day: 1.00 Years: 50.00 Pack years: 50.00 Types: Cigarettes Start date: 1/1/1970  Smokeless tobacco: Never Used  Tobacco comment: 0.5-1 ppd, started Substance Use Topics  Alcohol use: Yes Comment: rare, special occasions 1 glass or less  Drug use: No  
  Comment: past marijuana FH: Reviewed with patient Family History Problem Relation Age of Onset  Arthritis Mother  Cancer Mother   
     bladder  Cancer Father   
     prostate or pancreatic  High Cholesterol Father  Colon Cancer Other   
     uncle, around 62s  Breast Cancer Neg Hx Medications/Allergies: Reviewed with patient Current Outpatient Medications on File Prior to Visit Medication Sig Dispense Refill  amLODIPine (NORVASC) 5 mg tablet Take 1 Tab by mouth daily. 90 Tab 3  
 atorvastatin (LIPITOR) 40 mg tablet Take 1 Tab by mouth daily. 90 Tab 3  clopidogrel (PLAVIX) 75 mg tab Take 1 Tab by mouth daily for 180 days. 90 Tab 1  
 hydroCHLOROthiazide (HYDRODIURIL) 25 mg tablet Take 1 Tab by mouth daily. 90 Tab 3  
 fish,bora,flax oils-om3,6,9 #1 (OMEGA 3-6-9) 1,200 mg cap Take 1 Tab by mouth daily.  loratadine (CLARITIN) 10 mg tablet Take 10 mg by mouth daily. No current facility-administered medications on file prior to visit. Allergies Allergen Reactions  Latex Other (comments)  
  rash  Aspirin Palpitations  Erythromycin Hives  Milk Containing Products Not Reported This Time  Sulfa (Sulfonamide Antibiotics) Hives  Tape [Adhesive] Other (comments)  Tetracycline Other (comments) Objective: 
Visit Vitals /76 Pulse 91 Temp 96.8 °F (36 °C) (Oral) Resp 18 Ht 5' 2.5\" (1.588 m) Wt 149 lb (67.6 kg) SpO2 98% BMI 26.82 kg/m² Body mass index is 26.82 kg/m². Physical Exam: sitting comfortably in no acute distress Assessment of cognitive impairment: Alert and oriented x person, situation, date, day of week, month, year, state and city MMSE/MOCA: N/A Depression Screen: PHQ over the last two weeks 11/19/2018 Little interest or pleasure in doing things Not at all Feeling down, depressed, irritable, or hopeless Not at all Total Score PHQ 2 0 Fall Risk Assessment:   
Fall Risk Assessment, last 12 mths 11/19/2018 Able to walk? Yes Fall in past 12 months? No  
 
 
Functional Ability:  
Does the patient exhibit a steady gait? yes How long did it take the patient to get up and walk from a sitting position? <3 seconds Is the patient self reliant?  (ie can do own laundry, meals, household chores)  Yes, living by self Does the patient handle his/her own medications? yes Does the patient handle his/her own money? yes Is the patients home safe (ie good lighting, handrails on stairs and bath, etc.)? yes Did you notice or did patient express any hearing difficulties? No 
  
Did you notice or did patient express any vision difficulties? Yes, wearing glasses Were distance and reading eye charts used? yes Advance Care Planning:  
Patient was offered the opportunity to discuss advance care planning:  yes Does patient have an Advance Directive:  no If no, did you provide information on Caring Connections?  no  
 
Plan:   
AVS given to patient with schedule of health care maintenance Encouraged vaccines, patient declines Encouraged to schedule well woman exam 
 
 
Health Maintenance Topic Date Due  
 EYE EXAM RETINAL OR DILATED Q1  11/12/1965  PAP AKA CERVICAL CYTOLOGY  11/12/1976  BREAST CANCER SCRN MAMMOGRAM  11/12/2005  FOBT Q 1 YEAR AGE 50-75  11/12/2005  Influenza Age 5 to Adult  05/03/2019 (Originally 8/1/2018)  Pneumococcal 19-64 Medium Risk (1 of 1 - PPSV23) 11/19/2019 (Originally 11/12/1974)  DTaP/Tdap/Td series (1 - Tdap) 11/19/2019 (Originally 11/12/1976)  Shingrix Vaccine Age 50> (1 of 2) 11/19/2019 (Originally 11/12/2005)  HEMOGLOBIN A1C Q6M  04/03/2019  LIPID PANEL Q1  09/07/2019  MICROALBUMIN Q1  10/05/2019  
 FOOT EXAM Q1  11/19/2019  MEDICARE YEARLY EXAM  11/20/2019  Hepatitis C Screening  Completed *Patient verbalized understanding and agreement with the plan. A copy of the After Visit Summary with personalized health plan was given to the patient today.

## 2018-12-03 ENCOUNTER — TELEPHONE (OUTPATIENT)
Dept: FAMILY MEDICINE CLINIC | Age: 63
End: 2018-12-03

## 2018-12-03 NOTE — TELEPHONE ENCOUNTER
Pt lvm stating she received message from the pharmacy for Rx refill that provider office has not responded. Pt did not specify Rx on voicemail, just said she will be out of Rx in two days. Pt also stated Rx should be clarified with pharmacy at [phone].

## 2018-12-03 NOTE — TELEPHONE ENCOUNTER
Lvm for patient to return call to our office of name medication, dose and amount. So we can provider her with the correct medication. Name and number given.

## 2018-12-04 ENCOUNTER — TELEPHONE (OUTPATIENT)
Dept: FAMILY MEDICINE CLINIC | Age: 63
End: 2018-12-04

## 2018-12-04 NOTE — TELEPHONE ENCOUNTER
Received fax transmission from [facility]. Sent to be signed, sent, and scanned.

## 2018-12-05 DIAGNOSIS — I10 ESSENTIAL HYPERTENSION: ICD-10-CM

## 2018-12-05 DIAGNOSIS — Z86.73 HX OF ISCHEMIC LEFT PCA STROKE: Primary | ICD-10-CM

## 2018-12-05 DIAGNOSIS — J30.89 ENVIRONMENTAL AND SEASONAL ALLERGIES: ICD-10-CM

## 2018-12-05 RX ORDER — HYDROCHLOROTHIAZIDE 25 MG/1
25 TABLET ORAL DAILY
Qty: 90 TAB | Refills: 3 | Status: SHIPPED | OUTPATIENT
Start: 2018-12-05 | End: 2018-12-06 | Stop reason: SDUPTHER

## 2018-12-05 RX ORDER — AMLODIPINE BESYLATE 5 MG/1
5 TABLET ORAL DAILY
Qty: 90 TAB | Refills: 3 | Status: SHIPPED | OUTPATIENT
Start: 2018-12-05 | End: 2018-12-06 | Stop reason: SDUPTHER

## 2018-12-05 RX ORDER — ATORVASTATIN CALCIUM 40 MG/1
40 TABLET, FILM COATED ORAL DAILY
Qty: 90 TAB | Refills: 3 | Status: SHIPPED | OUTPATIENT
Start: 2018-12-05 | End: 2018-12-06

## 2018-12-05 RX ORDER — CLOPIDOGREL BISULFATE 75 MG/1
75 TABLET ORAL DAILY
Qty: 90 TAB | Refills: 1 | Status: SHIPPED | OUTPATIENT
Start: 2018-12-05 | End: 2018-12-06 | Stop reason: SDUPTHER

## 2018-12-05 RX ORDER — ATORVASTATIN CALCIUM 40 MG/1
TABLET, FILM COATED ORAL
Qty: 30 TAB | Refills: 0 | Status: SHIPPED | OUTPATIENT
Start: 2018-12-05 | End: 2018-12-06 | Stop reason: SDUPTHER

## 2018-12-05 NOTE — TELEPHONE ENCOUNTER
Per pharmacist from [facility] pt is out of refills. Requesting 90 day supply on all medications per insurance company. Requested Prescriptions     Pending Prescriptions Disp Refills    atorvastatin (LIPITOR) 40 mg tablet [Pharmacy Med Name: ATORVASTATIN 40MG   TAB] 30 Tab 0     Sig: TAKE 1 TABLET BY MOUTH ONCE DAILY    clopidogrel (PLAVIX) 75 mg tab 90 Tab 1     Sig: Take 1 Tab by mouth daily for 180 days.  amLODIPine (NORVASC) 5 mg tablet 90 Tab 3     Sig: Take 1 Tab by mouth daily.  atorvastatin (LIPITOR) 40 mg tablet 90 Tab 3     Sig: Take 1 Tab by mouth daily.  hydroCHLOROthiazide (HYDRODIURIL) 25 mg tablet 90 Tab 3     Sig: Take 1 Tab by mouth daily.

## 2018-12-06 RX ORDER — AMLODIPINE BESYLATE 5 MG/1
5 TABLET ORAL DAILY
Qty: 90 TAB | Refills: 3 | Status: SHIPPED | OUTPATIENT
Start: 2018-12-06 | End: 2019-12-06

## 2018-12-06 RX ORDER — HYDROCHLOROTHIAZIDE 25 MG/1
25 TABLET ORAL DAILY
Qty: 90 TAB | Refills: 3 | Status: SHIPPED | OUTPATIENT
Start: 2018-12-06 | End: 2019-12-06

## 2018-12-06 RX ORDER — ATORVASTATIN CALCIUM 40 MG/1
40 TABLET, FILM COATED ORAL DAILY
Qty: 90 TAB | Refills: 3 | Status: SHIPPED | OUTPATIENT
Start: 2018-12-06 | End: 2019-12-06

## 2018-12-06 RX ORDER — LORATADINE 10 MG/1
10 TABLET ORAL
Qty: 90 TAB | Refills: 3 | Status: SHIPPED | OUTPATIENT
Start: 2018-12-06

## 2018-12-06 RX ORDER — CLOPIDOGREL BISULFATE 75 MG/1
75 TABLET ORAL DAILY
Qty: 90 TAB | Refills: 1 | Status: SHIPPED | OUTPATIENT
Start: 2018-12-06 | End: 2019-06-04

## 2019-01-11 ENCOUNTER — TELEPHONE (OUTPATIENT)
Dept: FAMILY MEDICINE CLINIC | Age: 64
End: 2019-01-11

## 2019-01-31 ENCOUNTER — TELEPHONE (OUTPATIENT)
Dept: FAMILY MEDICINE CLINIC | Age: 64
End: 2019-01-31

## 2019-01-31 NOTE — TELEPHONE ENCOUNTER
Pt inquiring if it is ok to take Vit D3 with other meds currently taking.  Pt states will not start taking until here from nurse

## 2019-02-04 NOTE — TELEPHONE ENCOUNTER
Left message for patient to call back.   Need to inform patient that it is ok for her to take Vit D.

## 2019-02-08 NOTE — TELEPHONE ENCOUNTER
Left another message for patient to call back.   Need to inform patient that she can take the Vit D.

## 2019-02-14 ENCOUNTER — TELEPHONE (OUTPATIENT)
Dept: FAMILY MEDICINE CLINIC | Age: 64
End: 2019-02-14

## 2019-02-14 NOTE — TELEPHONE ENCOUNTER
Received fax transmission from [facility] [address] Encounter. Sent to be signed, sent, and scanned.

## 2019-03-25 RX ORDER — CLOPIDOGREL BISULFATE 75 MG/1
TABLET ORAL
Qty: 90 TAB | Refills: 1 | Status: SHIPPED | OUTPATIENT
Start: 2019-03-25 | End: 2019-09-05 | Stop reason: SDUPTHER

## 2019-09-11 RX ORDER — CLOPIDOGREL BISULFATE 75 MG/1
TABLET ORAL
Qty: 90 TAB | Refills: 1 | Status: SHIPPED | OUTPATIENT
Start: 2019-09-11

## 2019-09-11 RX ORDER — AMLODIPINE BESYLATE 5 MG/1
TABLET ORAL
Qty: 90 TAB | Refills: 3 | Status: SHIPPED | OUTPATIENT
Start: 2019-09-11

## 2019-09-11 RX ORDER — ATORVASTATIN CALCIUM 40 MG/1
TABLET, FILM COATED ORAL
Qty: 90 TAB | Refills: 3 | Status: SHIPPED | OUTPATIENT
Start: 2019-09-11
